# Patient Record
Sex: FEMALE | Race: WHITE | Employment: FULL TIME | URBAN - METROPOLITAN AREA
[De-identification: names, ages, dates, MRNs, and addresses within clinical notes are randomized per-mention and may not be internally consistent; named-entity substitution may affect disease eponyms.]

---

## 2017-02-23 ENCOUNTER — OP HISTORICAL/CONVERTED ENCOUNTER (OUTPATIENT)
Dept: OTHER | Age: 57
End: 2017-02-23

## 2017-08-23 ENCOUNTER — TELEPHONE (OUTPATIENT)
Dept: DERMATOLOGY | Facility: AMBULATORY SURGERY CENTER | Age: 57
End: 2017-08-23

## 2017-08-23 NOTE — TELEPHONE ENCOUNTER
Patient Appointment Date: 08/24/2017      Moriah Alvarado, 60 y.o., female  Is calling for their Mohs Pre-Op Assessment    does not have Hepatitis C or HIV (If YES, set up consult appointment)  does confirm site (if pathology available)  does ID site. Allergies: Allergies not on file      does not have a Pacemaker  does not have a Defibrillator    does not need antibiotics    is not taking NSAIDs    is not taking aspirin    is not taking garlic  is not taking ginkgo  is not taking Ginseng  is not taking fish oils  is not taking vit E    does not take a blood thinner    is not taking Coumadin/Warfarin     Pre operative assessment questions asked to patient. Patient has a general understanding of the procedure, and has been versed that there will be local anesthesia used in the procedure and that She will be ok to drive themselves to and from the appointment.

## 2017-08-24 ENCOUNTER — OFFICE VISIT (OUTPATIENT)
Dept: DERMATOLOGY | Facility: AMBULATORY SURGERY CENTER | Age: 57
End: 2017-08-24

## 2017-08-24 VITALS
OXYGEN SATURATION: 96 % | WEIGHT: 293 LBS | DIASTOLIC BLOOD PRESSURE: 82 MMHG | TEMPERATURE: 98.2 F | BODY MASS INDEX: 47.09 KG/M2 | SYSTOLIC BLOOD PRESSURE: 140 MMHG | HEART RATE: 74 BPM | HEIGHT: 66 IN

## 2017-08-24 DIAGNOSIS — C44.311 BASAL CELL CARCINOMA OF LEFT ALA NASI: Primary | ICD-10-CM

## 2017-08-24 RX ORDER — BUPIVACAINE HYDROCHLORIDE AND EPINEPHRINE 2.5; 5 MG/ML; UG/ML
1 INJECTION, SOLUTION EPIDURAL; INFILTRATION; INTRACAUDAL; PERINEURAL ONCE
Qty: 1 ML | Refills: 0
Start: 2017-08-24 | End: 2017-08-24

## 2017-08-24 RX ORDER — GLIPIZIDE 5 MG/1
TABLET ORAL
Refills: 0 | COMMUNITY
Start: 2017-08-13

## 2017-08-24 RX ORDER — SIMVASTATIN 40 MG/1
TABLET, FILM COATED ORAL
Refills: 11 | COMMUNITY
Start: 2017-08-03 | End: 2022-03-02 | Stop reason: ALTCHOICE

## 2017-08-24 RX ORDER — RANITIDINE 300 MG/1
TABLET ORAL
Refills: 11 | COMMUNITY
Start: 2017-05-27 | End: 2022-03-02 | Stop reason: ALTCHOICE

## 2017-08-24 RX ORDER — LIDOCAINE HYDROCHLORIDE AND EPINEPHRINE 20; 5 MG/ML; UG/ML
1.5 INJECTION, SOLUTION EPIDURAL; INFILTRATION; INTRACAUDAL; PERINEURAL ONCE
Qty: 1.5 ML | Refills: 0 | Status: SHIPPED | OUTPATIENT
Start: 2017-08-24 | End: 2017-08-24

## 2017-08-24 RX ORDER — LOSARTAN POTASSIUM AND HYDROCHLOROTHIAZIDE 25; 100 MG/1; MG/1
TABLET ORAL
Refills: 0 | COMMUNITY
Start: 2017-08-13 | End: 2022-03-02 | Stop reason: ALTCHOICE

## 2017-08-24 RX ORDER — FLUOXETINE HYDROCHLORIDE 40 MG/1
CAPSULE ORAL
Refills: 0 | COMMUNITY
Start: 2017-08-13

## 2017-08-24 NOTE — PROGRESS NOTES
Pre-op: Patient present today for the evaluation of BCC to the Left ala. Procedure explained with full understanding. Vitals:    08/24/17 0913   BP: 140/82   Pulse: 74   Temp: 98.2 °F (36.8 °C)   TempSrc: Oral   SpO2: 96%   Weight: 134.7 kg (297 lb)   Height: 5' 6\" (1.676 m)     preoperatively, will continue to monitor. Post-op: Written and verbal post-op wound care instructions given to patient with full understanding of care. Surgical wound bandaged with Vaseline, Telfa, 2x2 gauze, and coverall tape. All questions and concerns addressed. Vitals stable postoperatively.

## 2017-08-24 NOTE — PATIENT INSTRUCTIONS
WOUND CARE INSTRUCTIONS    1. Keep the dressing clean and dry and do not remove for 48 hours. 2. Then change the dressing once a day as follows:  a. Wash hands before and after each dressing change. b. Remove dressing and wash site gently with mild soap and water, rinse, and pat dry.  c. Apply an ointment (Bacitracin, Polysporin, Neosporin, Petroleum jelly or Aquaphor). d. Apply a non-stick (Telfa) dressing or Band-Aid to cover the wound. Remove pressure bandage Saturday, then wash gently and apply Vaseline and a band-aid to site daily for 1 week. 3. Watch for:  BLEEDING: A small amount of drainage may occur. If bleeding occurs, elevate and rest the surgery site. Apply gauze and steady pressure for 15 minutes. If bleeding continues, call this office. INFECTION: Signs of infection include increased redness, pain, warmth, drainage of pus, and fever. If this occurs, call this office. 4. Special Instructions (follow any that are checked):  · [] You have stitches that need to be removed in 0 days  · [x] Avoid bending at the waist and heavy lifting for two days. · [x] Sleep with your head elevated for the next two nights. · [x] Rest the surgery site and keep it elevated as much as possible for two days. · [x] You may apply an ice-pack for 10-15 minutes every waking hour for the rest of the day. · [] Eat a soft diet and avoid hot food and hot drinks for the rest of the day. · [] Other instructions: Follow up as directed  Take Tylenol for pain as needed. Once the site is healed with no remaining bandages or open areas, protect your surgical site and scar from the sun, as this area will be more sensitive. Use a broad spectrum sunscreen SPF 30 or higher daily, and a chemical free product (one containing zinc oxide or titanium dioxide) is a good choice if the area is sensitive. You may begin to gently massage the surgical site in 2-3 weeks, rubbing in a circular motion along the scar.  This can help reduce swelling and thickness of a scar. A scar cream may be used beginnning 1 month after the surgery. If you have any questions or concerns, please call our office Monday through Friday at [phone].

## 2017-08-24 NOTE — PROGRESS NOTES
This note is written by Alida Mccurdy, as dictated by Derrick Smith. Kriss Coleman MD.    CC: Basal cell carcinoma on the left ala     History of present illness:     Carmen Andrews is a 60 y.o. female referred by Dr. Abrahan Lucas. She has a biopsy-proven nodular basal cell carcinoma on the left ala. This is a recurrent basal cell carcinoma present for years described as a sore lesion that she noticed with prior treatment of cryotherapy. Biopsy confirmed the diagnosis of basal cell carcinoma, and I reviewed the written pathology. She is feeling well and in her usual state of health today. She has no pain, no current illnesses, no other skin concerns. Her allergies, medications, medical, and social history are reviewed by me today. Exam:     She is an awake, alert, and oriented 60 y.o. female who appears well and in no distress. There is no preauricular, submandibular, or cervical lymphadenopathy. I examined her nose. She has an 8 x 7 mm indistinct pink patch on her left ala. She confirms location. Assessment/plan:    1. Basal cell carcinoma, left ala. I discussed the diagnosis of basal cell carcinoma and summarized the pathology report. Mohs surgery is indicated by site, size, poor definition, and recurrence. The procedure was discussed, verbal and written consent were obtained. I performed the procedure. Two stages were required to reach a tumor free plane. The surgical defect was managed with a full thickness skin graft. There were no complications. She will follow up in one week for a graft check and as needed as the site heals. Indications, risks, and options were discussed with Carmen Andrews preoperatively. Risks including, but not limited to: pain, bleeding, infection, tumor recurrence, scarring and damage to motor and/or sensory nerves, were discussed. Carmen Andrews chose Mohs surgery. Carmen Andrews was an acceptable surgery candidate.     Carmen Andrews was placed in the appropriate position on the operating table in the Mohs surgery procedure room. The area was prepped and draped in the standard manner. Gentian violet was used to outline the clinical margins of the tumor. Local anesthesia was then obtained. The grossly visible tumor was then removed, an underlying layer was excised and mapped according to the Mohs technique, and the individual specimens examined microscopically. The process was repeated until microscopic examination of the tissue specimens confirmed a tumor-free plane. Hemostasis was obtained with electrosurgery and pressure. The wound was covered between stages with moist saline gauze. The wound management options of second intent healing, layered closure, local flap, or full thickness skin graft were discussed. Ms. Becky Mario understands the aims, risks, alternatives, and possible complications and elects to proceed with a full thickness skin graft. Ms. Becky Mario was placed in a supine position on the operating table in the Mohs surgery procedure room. A guiding suture utilizing 5-0 polysorb was used on the surgical site to minimize the size of the surgical defect. The area was prepped and draped in the standard manner. A template of donor skin the size of the wound was drawn at the left preauricular. 2% lidocaine with epinephrine 1:100,000 was used to anesthetize the donor area. The graft was removed from the donor area and defatted. Hemostasis was obtained with spot electrocoagulation. The margins of the donor site were undermined and then the site was sutured with 5-0 polysorb sutures to approximate the skin edges. The recipient site for graft was addressed with guiding sutures to reduce the size and width prior placement of the graft for support of the left ala and its rim. The defatted graft was placed on the recipient site and anchored in place with a running 5-0 fast absorbing gut suture around the margin.     A pressure dressing utilizing steristrips, Telfa, gauze, and Coverroll was placed on the donor site. A pressure dressing utilizing Vaseline, Telfa, gauze and Coverroll was placed on the surgical site. Wound care instructions (written and verbal) and a follow up appointment were given to the patient before discharge. Ms. Marylen Mulberry was discharged in good condition. 2. History of non melanoma skin cancer. I discussed the diagnosis and recommend routine examinations with Dr. Marga Devine for surveillance. The documentation recorded by the scribe accurately reflects the service I personally performed and the decisions made by me. [facility]   OFFICE PROCEDURE PROGRESS NOTE     Chart reviewed for the following:     Norberto Young Res, MD, have reviewed the History, Physical and updated the Allergic reactions for [address] performed immediately prior to start of procedure:     Norberto Young Res, MD, have performed the following reviews on Shivam Villegas prior to the start of the procedure:     * Patient was identified by name and date of birth   * Agreement on procedure being performed was verified   * Risks and Benefits explained to the patient   * Procedure site verified and marked as necessary   * Patient was positioned for comfort   * Consent was signed and verified     Time: 8:20 AM   Date of procedure: 8/24/2017  Procedure performed by: Patricia Young Res, MD   Provider assisted by: LPN   Patient assisted by: self   How tolerated by patient: tolerated the procedure well with no complications   Comments: none

## 2017-08-24 NOTE — MR AVS SNAPSHOT
Visit Information Date & Time Provider Department Dept. Phone Encounter #  
 8/24/2017  8:30 AM Lara Ragland MD [facility] [address] [phone] [id number] Upcoming Health Maintenance Date Due Hepatitis C Screening 1960 DTaP/Tdap/Td series (1 - Tdap) 9/1/1979 PAP AKA CERVICAL CYTOLOGY 9/1/1979 BREAST CANCER SCRN MAMMOGRAM 9/1/2008 FOBT Q 1 YEAR AGE 50-75 9/1/2008 INFLUENZA AGE 9 TO ADULT 8/1/2017 Allergies as of 8/24/2017  Review Complete On: 8/24/2017 By: Ami Friend No Known Allergies Current Immunizations  Never Reviewed No immunizations on file. Not reviewed this visit You Were Diagnosed With   
  
 Codes Comments Basal cell carcinoma of left ala nasi    -  Primary ICD-10-CM: W63.785 ICD-9-CM: 173.31 Vitals BP Pulse Temp Height(growth percentile) Weight(growth percentile) SpO2  
 140/82 (BP 1 Location: Left arm, BP Patient Position: Sitting) 74 98.2 °F (36.8 °C) (Oral) 5' 6\" (1.676 m) 297 lb (134.7 kg) 96% BMI OB Status Smoking Status 47.94 kg/m2 Premenopausal Never Smoker Vitals History BMI and BSA Data Body Mass Index Body Surface Area  
 47.94 kg/m 2 2.5 m 2 Preferred Pharmacy Pharmacy Name Phone [id number] [place] [address] [phone] Your Updated Medication List  
  
   
This list is accurate as of: 8/24/17 10:05 AM.  Always use your most recent med list.  
  
  
  
  
 FLUoxetine 40 mg capsule Commonly known as:  PROzac TAKE 1 CAPSULE BY MOUTH ONCE A DAY  
  
 glipiZIDE 5 mg tablet Commonly known as:  GLUCOTROL  
TAKE 1 TABLET ONCE A DAY ORALLY 30  
  
 losartan-hydroCHLOROthiazide 100-25 mg per tablet Commonly known as:  HYZAAR  
TAKE 1 TABLET BEFORE BREAKFAST ONCE A DAY ORALLY 30 DAYS  
  
 raNITIdine 300 mg tablet Commonly known as:  ZANTAC  
1 TABLET AT BEDTIME ONCE A DAY ORALLY 30 DAY(S)  
  
 simvastatin 40 mg tablet Commonly known as:  ZOCOR  
TAKE 0.5 TABLETS IN THE EVENING ONCE A DAY ORALLY 30 DAYS Patient Instructions WOUND CARE INSTRUCTIONS 1. Keep the dressing clean and dry and do not remove for 48 hours. 2. Then change the dressing once a day as follows: 
a. Wash hands before and after each dressing change. b. Remove dressing and wash site gently with mild soap and water, rinse, and pat dry. 
c. Apply an ointment (Bacitracin, Polysporin, Neosporin, Petroleum jelly or Aquaphor). d. Apply a non-stick (Telfa) dressing or Band-Aid to cover the wound. Remove pressure bandage Saturday, then wash gently and apply Vaseline and a band-aid to site daily for 1 week. 3. Watch for: BLEEDING: A small amount of drainage may occur. If bleeding occurs, elevate and rest the surgery site. Apply gauze and steady pressure for 15 minutes. If bleeding continues, call this office. INFECTION: Signs of infection include increased redness, pain, warmth, drainage of pus, and fever. If this occurs, call this office. 4. Special Instructions (follow any that are checked): 
· [] You have stitches that need to be removed in 0 days · [x] Avoid bending at the waist and heavy lifting for two days. · [x] Sleep with your head elevated for the next two nights. · [x] Rest the surgery site and keep it elevated as much as possible for two days. · [x] You may apply an ice-pack for 10-15 minutes every waking hour for the rest of the day. · [] Eat a soft diet and avoid hot food and hot drinks for the rest of the day. · [] Other instructions: Follow up as directed Take Tylenol for pain as needed. Once the site is healed with no remaining bandages or open areas, protect your surgical site and scar from the sun, as this area will be more sensitive.   Use a broad spectrum sunscreen SPF 30 or higher daily, and a chemical free product (one containing zinc oxide or titanium dioxide) is a good choice if the area is sensitive. You may begin to gently massage the surgical site in 2-3 weeks, rubbing in a circular motion along the scar. This can help reduce swelling and thickness of a scar. A scar cream may be used beginnning 1 month after the surgery. If you have any questions or concerns, please call our office Monday through Friday at [phone]. Introducing [facility]! [address] introduces [employer] patient portal. Now you can access parts of your medical record, email your doctor's office, and request medication refills online. 1. In your internet browser, go to [email or web address] 2. Click on the First Time User? Click Here link in the Sign In box. You will see the New Member Sign Up page. 3. Enter your [employer] Access Code exactly as it appears below. You will not need to use this code after youve completed the sign-up process. If you do not sign up before the expiration date, you must request a new code. · [employer] Access Code: [email or web address] Expires: 11/22/2017 10:05 AM 
 
4. Enter the last four digits of your Social Security Number (xxxx) and Date of Birth (mm/dd/yyyy) as indicated and click Submit. You will be taken to the next sign-up page. 5. Create a [employer] ID. This will be your [employer] login ID and cannot be changed, so think of one that is secure and easy to remember. 6. Create a [employer] password. You can change your password at any time. 7. Enter your Password Reset Question and Answer. This can be used at a later time if you forget your password. 8. Enter your e-mail address. You will receive e-mail notification when new information is available in [address] 9. Click Sign Up. You can now view and download portions of your medical record. 10. Click the Download Summary menu link to download a portable copy of your medical information.  
 
If you have questions, please visit the Frequently Asked Questions section of the [employer] website. Remember, [employer] is NOT to be used for urgent needs. For medical emergencies, dial 911. Now available from your iPhone and Android! Please provide this summary of care documentation to your next provider. Your primary care clinician is listed as Karma Mckinney. If you have any questions after today's visit, please call [phone].

## 2017-08-25 ENCOUNTER — TELEPHONE (OUTPATIENT)
Dept: DERMATOLOGY | Facility: AMBULATORY SURGERY CENTER | Age: 57
End: 2017-08-25

## 2017-08-25 NOTE — TELEPHONE ENCOUNTER
Spoke with pt states she is doing good so far from surgery.  Pt states she did have some pain in her teeth last night but that has since gone away

## 2017-08-31 ENCOUNTER — OFFICE VISIT (OUTPATIENT)
Dept: DERMATOLOGY | Facility: AMBULATORY SURGERY CENTER | Age: 57
End: 2017-08-31

## 2017-08-31 VITALS
SYSTOLIC BLOOD PRESSURE: 130 MMHG | OXYGEN SATURATION: 96 % | WEIGHT: 293 LBS | HEART RATE: 59 BPM | HEIGHT: 66 IN | TEMPERATURE: 98.2 F | BODY MASS INDEX: 47.09 KG/M2 | DIASTOLIC BLOOD PRESSURE: 85 MMHG | RESPIRATION RATE: 20 BRPM

## 2017-08-31 DIAGNOSIS — Z85.828 HISTORY OF BASAL CELL CARCINOMA EXCISION: Primary | ICD-10-CM

## 2017-08-31 DIAGNOSIS — Z94.5 HISTORY OF SKIN GRAFT: ICD-10-CM

## 2017-08-31 DIAGNOSIS — Z98.890 HISTORY OF BASAL CELL CARCINOMA EXCISION: Primary | ICD-10-CM

## 2017-08-31 NOTE — PROGRESS NOTES
This note was written by Kendra Goyal, as dictated by Dagoberto Zambrano MD.     Wound check/suture removal:    Chief complaint: wound check. HPI: Tess Osgood presents for wound check following Mohs surgery performed by me on 08/24/17. She had a basal cell carcinoma on the left ala. The surgical site was managed with a guiding suture and full thickness skin graft. The donor site for the full thickeness skin graft was her left preauricular. She reports that the surgical site and donor site have been doing well. Exam: The surgical site was examined. There is not evidence of infection. There is not erythema. There is not edema. There is mild crusting and granulation tissue at the graft site. The donor site was examined. There is not evidence of infection. There is not erythema. There is not edema. A/P:  Wound check. The graft is healing well and was gently debrided. The donor site is healing well. Additional care was reviewed. I recommended the use of Vaseline and a band aid on the surgical site for two weeks. Follow up will be in two weeks. The documentation recorded by the scribe accurately reflects the service I personally performed and the decisions made by me. Isi Jo

## 2017-09-14 ENCOUNTER — OFFICE VISIT (OUTPATIENT)
Dept: DERMATOLOGY | Facility: AMBULATORY SURGERY CENTER | Age: 57
End: 2017-09-14

## 2017-09-14 VITALS
DIASTOLIC BLOOD PRESSURE: 88 MMHG | HEART RATE: 59 BPM | TEMPERATURE: 98.1 F | SYSTOLIC BLOOD PRESSURE: 140 MMHG | OXYGEN SATURATION: 96 %

## 2017-09-14 DIAGNOSIS — Z94.5 HISTORY OF SKIN GRAFT: Primary | ICD-10-CM

## 2017-09-14 NOTE — PROGRESS NOTES
Wound check/suture removal:     Chief complaint: wound check.     HPI: Turner De Los Santos presents for wound check following Mohs surgery performed by me on 08/24/17. She had a basal cell carcinoma on the left ala. The surgical site was managed with a guiding suture and full thickness skin graft. The donor site for the full thickeness skin graft was her left preauricular. She presented on 08/31/17 for a graft check. This check showed that the graft and donor site were healing well. There was mild crusting and granulation tissue at the graft site. The graft site was gently debrided. I recommended the continued use of Vaseline and a band aid. Since her last wound check, she reports mild swelling.      Exam: The surgical site was examined. There is not evidence of infection. There is not erythema. There is not edema. She has a couple remaining polysorb sutures. There is granulation tissue at the graft site. Nasal contour is good.     A/P:  Wound check. The remaining polysorb sutures were removed. The graft is healing well. Additional care was reviewed. I recommended the use of Vaseline and a band aid on the graft site for one week. I advised that she massage the site as well. Follow up will be as needed.     The documentation recorded by the scribe accurately reflects the service I personally performed and the decisions made by me.

## 2018-05-24 ENCOUNTER — OP HISTORICAL/CONVERTED ENCOUNTER (OUTPATIENT)
Dept: OTHER | Age: 58
End: 2018-05-24

## 2019-02-17 ENCOUNTER — IP HISTORICAL/CONVERTED ENCOUNTER (OUTPATIENT)
Dept: OTHER | Age: 59
End: 2019-02-17

## 2019-04-02 ENCOUNTER — IP HISTORICAL/CONVERTED ENCOUNTER (OUTPATIENT)
Dept: OTHER | Age: 59
End: 2019-04-02

## 2019-04-04 ENCOUNTER — ED HISTORICAL/CONVERTED ENCOUNTER (OUTPATIENT)
Dept: OTHER | Age: 59
End: 2019-04-04

## 2020-07-06 ENCOUNTER — ED HISTORICAL/CONVERTED ENCOUNTER (OUTPATIENT)
Dept: OTHER | Age: 60
End: 2020-07-06

## 2022-02-11 ENCOUNTER — APPOINTMENT (OUTPATIENT)
Dept: CT IMAGING | Age: 62
End: 2022-02-11
Attending: NURSE PRACTITIONER
Payer: COMMERCIAL

## 2022-02-11 ENCOUNTER — HOSPITAL ENCOUNTER (EMERGENCY)
Age: 62
Discharge: HOME | End: 2022-02-12
Payer: COMMERCIAL

## 2022-02-11 DIAGNOSIS — R31.21 ASYMPTOMATIC MICROSCOPIC HEMATURIA: Primary | ICD-10-CM

## 2022-02-11 LAB
ALBUMIN SERPL-MCNC: 3.3 G/DL (ref 3.5–5)
ALBUMIN/GLOB SERPL: 0.8 {RATIO} (ref 1.1–2.2)
ALP SERPL-CCNC: 94 U/L (ref 45–117)
ALT SERPL-CCNC: 22 U/L (ref 12–78)
ANION GAP SERPL CALC-SCNC: 5 MMOL/L (ref 5–15)
APPEARANCE UR: ABNORMAL
AST SERPL W P-5'-P-CCNC: 14 U/L (ref 15–37)
BACTERIA URNS QL MICRO: NEGATIVE /HPF
BACTERIA URNS QL MICRO: NEGATIVE /HPF
BASOPHILS # BLD: 0 K/UL (ref 0–0.1)
BASOPHILS NFR BLD: 0 % (ref 0–1)
BILIRUB SERPL-MCNC: 0.4 MG/DL (ref 0.2–1)
BILIRUB UR QL: NEGATIVE
BUN SERPL-MCNC: 19 MG/DL (ref 6–20)
BUN/CREAT SERPL: 18 (ref 12–20)
CA-I BLD-MCNC: 9.2 MG/DL (ref 8.5–10.1)
CHLORIDE SERPL-SCNC: 102 MMOL/L (ref 97–108)
CO2 SERPL-SCNC: 28 MMOL/L (ref 21–32)
COLOR UR: ABNORMAL
CREAT SERPL-MCNC: 1.07 MG/DL (ref 0.55–1.02)
DIFFERENTIAL METHOD BLD: ABNORMAL
EOSINOPHIL # BLD: 0.1 K/UL (ref 0–0.4)
EOSINOPHIL NFR BLD: 1 % (ref 0–7)
ERYTHROCYTE [DISTWIDTH] IN BLOOD BY AUTOMATED COUNT: 13.3 % (ref 11.5–14.5)
GLOBULIN SER CALC-MCNC: 4.2 G/DL (ref 2–4)
GLUCOSE SERPL-MCNC: 251 MG/DL (ref 65–100)
GLUCOSE UR STRIP.AUTO-MCNC: 50 MG/DL
HCT VFR BLD AUTO: 43.1 % (ref 35–47)
HGB BLD-MCNC: 14 G/DL (ref 11.5–16)
HGB UR QL STRIP: ABNORMAL
IMM GRANULOCYTES # BLD AUTO: 0 K/UL (ref 0–0.04)
IMM GRANULOCYTES NFR BLD AUTO: 0 % (ref 0–0.5)
KETONES UR QL STRIP.AUTO: 5 MG/DL
LEUKOCYTE ESTERASE UR QL STRIP.AUTO: NEGATIVE
LYMPHOCYTES # BLD: 3.6 K/UL (ref 0.8–3.5)
LYMPHOCYTES NFR BLD: 31 % (ref 12–49)
MCH RBC QN AUTO: 28.2 PG (ref 26–34)
MCHC RBC AUTO-ENTMCNC: 32.5 G/DL (ref 30–36.5)
MCV RBC AUTO: 86.7 FL (ref 80–99)
MONOCYTES # BLD: 0.8 K/UL (ref 0–1)
MONOCYTES NFR BLD: 7 % (ref 5–13)
MUCOUS THREADS URNS QL MICRO: ABNORMAL /LPF
NEUTS SEG # BLD: 6.9 K/UL (ref 1.8–8)
NEUTS SEG NFR BLD: 61 % (ref 32–75)
NITRITE UR QL STRIP.AUTO: NEGATIVE
NRBC # BLD: 0 K/UL (ref 0–0.01)
NRBC BLD-RTO: 0 PER 100 WBC
PH UR STRIP: 5 [PH] (ref 5–8)
PLATELET # BLD AUTO: 300 K/UL (ref 150–400)
PMV BLD AUTO: 9.1 FL (ref 8.9–12.9)
POTASSIUM SERPL-SCNC: 4.1 MMOL/L (ref 3.5–5.1)
PROT SERPL-MCNC: 7.5 G/DL (ref 6.4–8.2)
PROT UR STRIP-MCNC: 100 MG/DL
RBC # BLD AUTO: 4.97 M/UL (ref 3.8–5.2)
RBC #/AREA URNS HPF: 352 /HPF (ref 0–5)
RBC #/AREA URNS HPF: >100 /HPF (ref 0–5)
SODIUM SERPL-SCNC: 135 MMOL/L (ref 136–145)
SP GR UR REFRACTOMETRY: 1.03 (ref 1–1.03)
UROBILINOGEN UR QL STRIP.AUTO: 0.1 EU/DL (ref 0.1–1)
WBC # BLD AUTO: 11.4 K/UL (ref 3.6–11)
WBC URNS QL MICRO: 24 /HPF (ref 0–4)
WBC URNS QL MICRO: ABNORMAL /HPF (ref 0–4)

## 2022-02-11 PROCEDURE — 74178 CT ABD&PLV WO CNTR FLWD CNTR: CPT

## 2022-02-11 PROCEDURE — 85025 COMPLETE CBC W/AUTO DIFF WBC: CPT

## 2022-02-11 PROCEDURE — 99283 EMERGENCY DEPT VISIT LOW MDM: CPT

## 2022-02-11 PROCEDURE — 74011000636 HC RX REV CODE- 636: Performed by: NURSE PRACTITIONER

## 2022-02-11 PROCEDURE — 80053 COMPREHEN METABOLIC PANEL: CPT

## 2022-02-11 PROCEDURE — 81001 URINALYSIS AUTO W/SCOPE: CPT

## 2022-02-11 RX ADMIN — IOPAMIDOL 100 ML: 755 INJECTION, SOLUTION INTRAVENOUS at 23:28

## 2022-02-12 VITALS
WEIGHT: 293 LBS | OXYGEN SATURATION: 98 % | HEIGHT: 66 IN | HEART RATE: 56 BPM | DIASTOLIC BLOOD PRESSURE: 67 MMHG | BODY MASS INDEX: 47.09 KG/M2 | SYSTOLIC BLOOD PRESSURE: 116 MMHG | TEMPERATURE: 98.1 F | RESPIRATION RATE: 16 BRPM

## 2022-02-12 RX ORDER — CEPHALEXIN 500 MG/1
500 CAPSULE ORAL 4 TIMES DAILY
Qty: 28 CAPSULE | Refills: 0 | Status: SHIPPED | OUTPATIENT
Start: 2022-02-12 | End: 2022-02-12 | Stop reason: SDUPTHER

## 2022-02-12 RX ORDER — CEPHALEXIN 500 MG/1
500 CAPSULE ORAL 4 TIMES DAILY
Qty: 28 CAPSULE | Refills: 0 | Status: SHIPPED | OUTPATIENT
Start: 2022-02-12 | End: 2022-02-19

## 2022-02-12 NOTE — ED PROVIDER NOTES
EMERGENCY DEPARTMENT HISTORY AND PHYSICAL EXAM      Date: 2/11/2022  Patient Name: Riya Gomez    History of Presenting Illness     Chief Complaint   Patient presents with    Blood in Urine       History Provided By: Patient    HPI: Riya Gomez, 59 y.o. female with a past medical history significant diabetes, hypertension, hyperlipidemia and obesity presents to the ED with cc of area. Patient states she noticed she had blood in her urine today. Patient denies any pain. Patient denies any history of bladder cancer. Patient comes in for evaluation of her hematuria. There are no other complaints, changes, or physical findings at this time. PCP: Delores Rdz MD    No current facility-administered medications on file prior to encounter.      Current Outpatient Medications on File Prior to Encounter   Medication Sig Dispense Refill    simvastatin (ZOCOR) 40 mg tablet TAKE 0.5 TABLETS IN THE EVENING ONCE A DAY ORALLY 30 DAYS  11    raNITIdine (ZANTAC) 300 mg tablet 1 TABLET AT BEDTIME ONCE A DAY ORALLY 30 DAY(S)  11    FLUoxetine (PROZAC) 40 mg capsule TAKE 1 CAPSULE BY MOUTH ONCE A DAY  0    glipiZIDE (GLUCOTROL) 5 mg tablet TAKE 1 TABLET ONCE A DAY ORALLY 30  0    losartan-hydroCHLOROthiazide (HYZAAR) 100-25 mg per tablet TAKE 1 TABLET BEFORE BREAKFAST ONCE A DAY ORALLY 30 DAYS  0       Past History     Past Medical History:  Past Medical History:   Diagnosis Date    Atrial fibrillation (HCC)     Diabetes (HCC)     Essential hypertension     Hyperlipidemia     Skin cancer     Sun-damaged skin     Sunburn, blistering        Past Surgical History:  Past Surgical History:   Procedure Laterality Date    HX CHOLECYSTECTOMY      2014    HX MOHS PROCEDURES  08/24/2017    BCC L ala by Dr. Peggy Cowan        Family History:  Family History   Problem Relation Age of Onset    Diabetes Mother     Cancer Mother     Lung Disease Mother     Heart Disease Father     Diabetes Father     Cancer Paternal Aunt        Social History:  Social History     Tobacco Use    Smoking status: Never Smoker    Smokeless tobacco: Never Used   Substance Use Topics    Alcohol use: No    Drug use: No       Allergies:  No Known Allergies      Review of Systems   Review of Systems   Constitutional: Negative for chills, fatigue and fever. HENT: Negative for congestion, sinus pressure and trouble swallowing. Eyes: Negative for photophobia and pain. Respiratory: Negative for cough and shortness of breath. Cardiovascular: Negative for chest pain and leg swelling. Gastrointestinal: Negative for abdominal pain, diarrhea, nausea and vomiting. Endocrine: Negative for polydipsia, polyphagia and polyuria. Genitourinary: Positive for hematuria. Negative for decreased urine volume, difficulty urinating, dysuria and urgency. Musculoskeletal: Negative for back pain, gait problem, myalgias and neck pain. Skin: Negative for pallor and rash. Allergic/Immunologic: Negative for environmental allergies and food allergies. Neurological: Negative for dizziness, facial asymmetry, speech difficulty, numbness and headaches. Hematological: Negative for adenopathy. Does not bruise/bleed easily. Psychiatric/Behavioral: Negative for agitation, self-injury and suicidal ideas. The patient is not nervous/anxious. Physical Exam     Physical Exam  Vitals and nursing note reviewed. Constitutional:       Appearance: Normal appearance. HENT:      Head: Atraumatic. Right Ear: Tympanic membrane and external ear normal.      Left Ear: Tympanic membrane and external ear normal.      Nose: Nose normal.      Mouth/Throat:      Mouth: Mucous membranes are dry. Eyes:      Extraocular Movements: Extraocular movements intact. Pupils: Pupils are equal, round, and reactive to light. Cardiovascular:      Rate and Rhythm: Normal rate and regular rhythm. Pulses: Normal pulses. Heart sounds: Normal heart sounds. Pulmonary:      Breath sounds: Normal breath sounds. Abdominal:      General: Abdomen is flat. Palpations: Abdomen is soft. Musculoskeletal:         General: Normal range of motion. Cervical back: Normal range of motion and neck supple. Skin:     General: Skin is warm and dry. Capillary Refill: Capillary refill takes less than 2 seconds. Neurological:      General: No focal deficit present. Mental Status: She is alert and oriented to person, place, and time. Mental status is at baseline.    Psychiatric:         Mood and Affect: Mood normal.         Behavior: Behavior normal.         Lab and Diagnostic Study Results     Labs -     Recent Results (from the past 12 hour(s))   URINALYSIS W/ RFLX MICROSCOPIC    Collection Time: 02/11/22  7:52 PM   Result Value Ref Range    Color Martine      Appearance Turbid (A) Clear      Specific gravity 1.029 1.003 - 1.030      pH (UA) 5.0 5.0 - 8.0      Protein 100 (A) Negative mg/dL    Glucose 50 (A) Negative mg/dL    Ketone 5 (A) Negative mg/dL    Bilirubin Negative Negative      Blood Large (A) Negative      Urobilinogen 0.1 0.1 - 1.0 EU/dL    Nitrites Negative Negative      Leukocyte Esterase Negative Negative      WBC 20-50 0 - 4 /hpf    RBC >100 (H) 0 - 5 /hpf    Bacteria Negative Negative /hpf    Mucus 4+ /lpf   URINE MICROSCOPIC    Collection Time: 02/11/22  7:52 PM   Result Value Ref Range    WBC 24 (H) 0 - 4 /hpf     (H) 0 - 5 /hpf    Bacteria Negative Negative /hpf   CBC WITH AUTOMATED DIFF    Collection Time: 02/11/22 10:00 PM   Result Value Ref Range    WBC 11.4 (H) 3.6 - 11.0 K/uL    RBC 4.97 3.80 - 5.20 M/uL    HGB 14.0 11.5 - 16.0 g/dL    HCT 43.1 35.0 - 47.0 %    MCV 86.7 80.0 - 99.0 FL    MCH 28.2 26.0 - 34.0 PG    MCHC 32.5 30.0 - 36.5 g/dL    RDW 13.3 11.5 - 14.5 %    PLATELET 249 038 - 327 K/uL    MPV 9.1 8.9 - 12.9 FL    NRBC 0.0 0.0  WBC    ABSOLUTE NRBC 0.00 0.00 - 0.01 K/uL    NEUTROPHILS 61 32 - 75 %    LYMPHOCYTES 31 12 - 49 %    MONOCYTES 7 5 - 13 %    EOSINOPHILS 1 0 - 7 %    BASOPHILS 0 0 - 1 %    IMMATURE GRANULOCYTES 0 0 - 0.5 %    ABS. NEUTROPHILS 6.9 1.8 - 8.0 K/UL    ABS. LYMPHOCYTES 3.6 (H) 0.8 - 3.5 K/UL    ABS. MONOCYTES 0.8 0.0 - 1.0 K/UL    ABS. EOSINOPHILS 0.1 0.0 - 0.4 K/UL    ABS. BASOPHILS 0.0 0.0 - 0.1 K/UL    ABS. IMM. GRANS. 0.0 0.00 - 0.04 K/UL    DF AUTOMATED     METABOLIC PANEL, COMPREHENSIVE    Collection Time: 02/11/22 10:00 PM   Result Value Ref Range    Sodium 135 (L) 136 - 145 mmol/L    Potassium 4.1 3.5 - 5.1 mmol/L    Chloride 102 97 - 108 mmol/L    CO2 28 21 - 32 mmol/L    Anion gap 5 5 - 15 mmol/L    Glucose 251 (H) 65 - 100 mg/dL    BUN 19 6 - 20 mg/dL    Creatinine 1.07 (H) 0.55 - 1.02 mg/dL    BUN/Creatinine ratio 18 12 - 20      GFR est AA >60 >60 ml/min/1.73m2    GFR est non-AA 52 (L) >60 ml/min/1.73m2    Calcium 9.2 8.5 - 10.1 mg/dL    Bilirubin, total 0.4 0.2 - 1.0 mg/dL    AST (SGOT) 14 (L) 15 - 37 U/L    ALT (SGPT) 22 12 - 78 U/L    Alk. phosphatase 94 45 - 117 U/L    Protein, total 7.5 6.4 - 8.2 g/dL    Albumin 3.3 (L) 3.5 - 5.0 g/dL    Globulin 4.2 (H) 2.0 - 4.0 g/dL    A-G Ratio 0.8 (L) 1.1 - 2.2         Radiologic Studies -   @lastxrresult@  CT Results  (Last 48 hours)               02/11/22 2328  CT ABD PELV W WO CONT Final result    Impression:      No urinary stone or discrete mass. Renal lesions are too small to characterize,   may represent cysts or other. Urology consult and follow-up as clinically   indicated. Few colonic diverticula without acute diverticulitis. Please see full report.        Narrative:  CT abdomen and pelvis without and with intravenous contrast, 100 cc [id number]       Dose reduction: All CT scans at this facility are performed using dose reduction   optimization techniques as appropriate to a performed exam including the   following: Automated exposure control, adjustments of the mA and/or kV according   to patient size, or use of iterative reconstruction technique. INDICATION: Painless hematuria       FINDINGS:       No airspace disease in the lung bases. Liver and spleen are unremarkable. Pancreas is somewhat atrophic. No aneurysm of abdominal aorta. Mild   atherosclerosis. Cholecystectomy. No urinary stone or hydronephrosis or discrete renal mass on either side. Subcentimeter lesion in the lower pole of left kidney and probable tiny   additional bilateral renal lesions are too small to characterize, may represent   cysts or other. Excretion of contrast is seen from the kidneys on the delayed   images down to bladder. No obvious mass in the urinary bladder, although it is   only partially opacified. No bowel obstruction. Few colonic diverticula without acute diverticulitis. No   inflammatory changes to suggest appendicitis. Uterus and ovaries appear grossly   unremarkable based on CT criteria. Small periumbilical defect containing fat. No   free fluid in the pelvis. No acute fracture in the visualized bony structures. CXR Results  (Last 48 hours)    None            Medical Decision Making   - I am the first provider for this patient. - I reviewed the vital signs, available nursing notes, past medical history, past surgical history, family history and social history. - Initial assessment performed. The patients presenting problems have been discussed, and they are in agreement with the care plan formulated and outlined with them. I have encouraged them to ask questions as they arise throughout their visit. Vital Signs-Reviewed the patient's vital signs. Patient Vitals for the past 12 hrs:   Temp Pulse Resp BP SpO2   02/12/22 0032  (!) 56 16 116/67 98 %   02/11/22 1917 98.1 °F (36.7 °C) 66 16 (!) 155/80 98 %       Records Reviewed: Nursing Notes and Old Medical Records          ED Course:          Provider Notes (Medical Decision Making):   Patient presents with hematuria.   Differential diagnosis include kidney stone, UTI, STD, bladder cancer. Patient denies any pain. Patient will be discharged home with follow-up urology. MDM       Procedures   Medical Decision Makingedical Decision Making  Performed by: Joyce Winslow NP  PROCEDURES:  Procedures       Disposition   Disposition: DC- Adult Discharges: All of the diagnostic tests were reviewed and questions answered. Diagnosis, care plan and treatment options were discussed. The patient understands the instructions and will follow up as directed. The patients results have been reviewed with them. They have been counseled regarding their diagnosis. The patient verbally convey understanding and agreement of the signs, symptoms, diagnosis, treatment and prognosis and additionally agrees to follow up as recommended with their PCP in 24 - 48 hours. They also agree with the care-plan and convey that all of their questions have been answered. I have also put together some discharge instructions for them that include: 1) educational information regarding their diagnosis, 2) how to care for their diagnosis at home, as well a 3) list of reasons why they would want to return to the ED prior to their follow-up appointment, should their condition change. Discharged    DISCHARGE PLAN:  1.    Current Discharge Medication List      CONTINUE these medications which have NOT CHANGED    Details   simvastatin (ZOCOR) 40 mg tablet TAKE 0.5 TABLETS IN THE EVENING ONCE A DAY ORALLY 30 DAYS  Refills: 11    Associated Diagnoses: Basal cell carcinoma of left ala nasi      raNITIdine (ZANTAC) 300 mg tablet 1 TABLET AT BEDTIME ONCE A DAY ORALLY 30 DAY(S)  Refills: 11    Associated Diagnoses: Basal cell carcinoma of left ala nasi      FLUoxetine (PROZAC) 40 mg capsule TAKE 1 CAPSULE BY MOUTH ONCE A DAY  Refills: 0    Associated Diagnoses: Basal cell carcinoma of left ala nasi      glipiZIDE (GLUCOTROL) 5 mg tablet TAKE 1 TABLET ONCE A DAY ORALLY 30  Refills: 0    Associated Diagnoses: Basal cell carcinoma of left ala nasi      losartan-hydroCHLOROthiazide (HYZAAR) 100-25 mg per tablet TAKE 1 TABLET BEFORE BREAKFAST ONCE A DAY ORALLY 30 DAYS  Refills: 0    Associated Diagnoses: Basal cell carcinoma of left ala nasi           2. Follow-up Information     Follow up With Specialties Details Why Contact Info    Hair Bermudez MD Urology Schedule an appointment as soon as possible for a visit  As needed [address] [place] [id number] [facility]    [address] [place] [address]        3. Return to ED if worse   4. Discharge Medication List as of 2/12/2022 12:13 AM      START taking these medications    Details   cephALEXin (Keflex) 500 mg capsule Take 1 Capsule by mouth four (4) times daily for 7 days. , Normal, Disp-28 Capsule, R-0         CONTINUE these medications which have NOT CHANGED    Details   simvastatin (ZOCOR) 40 mg tablet TAKE 0.5 TABLETS IN THE EVENING ONCE A DAY ORALLY 30 DAYS, Historical Med, R-11      raNITIdine (ZANTAC) 300 mg tablet 1 TABLET AT BEDTIME ONCE A DAY ORALLY 30 DAY(S), Historical Med, R-11      FLUoxetine (PROZAC) 40 mg capsule TAKE 1 CAPSULE BY MOUTH ONCE A DAY, Historical Med, R-0      glipiZIDE (GLUCOTROL) 5 mg tablet TAKE 1 TABLET ONCE A DAY ORALLY 30, Historical Med, R-0      losartan-hydroCHLOROthiazide (HYZAAR) 100-25 mg per tablet TAKE 1 TABLET BEFORE BREAKFAST ONCE A DAY ORALLY 30 DAYS, Historical Med, R-0               Diagnosis     Clinical Impression:   1. Asymptomatic microscopic hematuria        Attestations:    Cristina Marquez NP    Please note that this dictation was completed with [email or web address], the [employer] voice recognition software. Quite often unanticipated grammatical, syntax, homophones, and other interpretive errors are inadvertently transcribed by the computer software. Please disregard these errors. Please excuse any errors that have escaped final proofreading.   Thank you.

## 2022-02-12 NOTE — ED TRIAGE NOTES
Pt states that she noted some blood in her urine today. States recently started on lasix. Denies any pain/burning with urination.

## 2022-02-15 ENCOUNTER — TELEPHONE (OUTPATIENT)
Dept: UROLOGY | Age: 62
End: 2022-02-15

## 2022-02-15 NOTE — TELEPHONE ENCOUNTER
Patient was seen at [facility] on 2/11 for blood in urine and suspected bladder cancer after test was done.  Told to follow up as soon as possible with dr. Tomasa Watkins

## 2022-02-17 NOTE — TELEPHONE ENCOUNTER
Lvm for  patient providing her with date and time if that date worked for her asked her to call the office to let us know

## 2022-02-23 PROBLEM — R31.0 GROSS HEMATURIA: Status: ACTIVE | Noted: 2022-02-23

## 2022-03-02 ENCOUNTER — OFFICE VISIT (OUTPATIENT)
Dept: UROLOGY | Age: 62
End: 2022-03-02
Payer: COMMERCIAL

## 2022-03-02 VITALS — WEIGHT: 293 LBS | BODY MASS INDEX: 47.09 KG/M2 | HEIGHT: 66 IN

## 2022-03-02 DIAGNOSIS — R31.0 GROSS HEMATURIA: ICD-10-CM

## 2022-03-02 PROCEDURE — 99204 OFFICE O/P NEW MOD 45 MIN: CPT | Performed by: UROLOGY

## 2022-03-02 RX ORDER — DULAGLUTIDE 3 MG/.5ML
INJECTION, SOLUTION SUBCUTANEOUS
COMMUNITY

## 2022-03-02 RX ORDER — INSULIN GLARGINE 300 U/ML
INJECTION, SOLUTION SUBCUTANEOUS
COMMUNITY

## 2022-03-02 RX ORDER — DILTIAZEM HYDROCHLORIDE 120 MG/1
CAPSULE, EXTENDED RELEASE ORAL DAILY
COMMUNITY

## 2022-03-02 RX ORDER — POTASSIUM CHLORIDE 750 MG/1
10 CAPSULE, EXTENDED RELEASE ORAL 2 TIMES DAILY
COMMUNITY

## 2022-03-02 RX ORDER — VALSARTAN 160 MG/1
TABLET ORAL DAILY
COMMUNITY

## 2022-03-02 RX ORDER — ATORVASTATIN CALCIUM 20 MG/1
TABLET, FILM COATED ORAL DAILY
COMMUNITY

## 2022-03-02 RX ORDER — FUROSEMIDE 40 MG/1
TABLET ORAL DAILY
COMMUNITY

## 2022-03-02 RX ORDER — PHENOL/SODIUM PHENOLATE
20 AEROSOL, SPRAY (ML) MUCOUS MEMBRANE DAILY
COMMUNITY

## 2022-03-02 NOTE — PROGRESS NOTES
HISTORY OF PRESENT ILLNESS  Noelle Thakkar is a 59 y.o. female. has a past medical history of Atrial fibrillation ([address] Diabetes ([address] Essential hypertension, Hyperlipidemia, Skin cancer, Sun-damaged skin, and Sunburn, blistering. She has no past medical history of Arsenic suspected exposure, Family history of skin cancer, Human immunodeficiency virus (HIV) disease ([address] Pacemaker, Radiation exposure, or Tanning bed exposure. has a past surgical history that includes hx mohs procedure (08/24/2017) and hx cholecystectomy. Chief Complaint   Patient presents with    New Patient    Gross Hematuria     HPI   She had gross painless hematuria presented to the emergency room on 2/11/2022. CT did not reveal stones, hydronephrosis or renal or ureteral abnormalities of concern. 3 months ago she had blood in her underwear for 1 day. She denies dysuria. She can have urinary frequency when she takes Lasix. It can be hard to hold the urine and she can leak down her leg. She does not normally wear a pad. Not a smoker.  smoked outside. Chronic Conditions Addressed Today     1. Gross hematuria     Overview      2/11/22: presented to ER with cc of gross hematuria. CT impression: no urinary stone or discrete mass. Renal lesions are too small to characterize, may represent cysts or other. UA micro with 24 WBC, 352 RBC and no bacteria. Current Assessment & Plan       Gross painless hematuria. On a blood thinner. She does have a history of atrial fibrillation. CT without obvious concerning upper tract findings. She should have cystoscopic evaluation. Plan on office cystoscopy               Past Medical History:    PMHx (including negatives):  has a past medical history of Atrial fibrillation ([address] Diabetes ([address] Essential hypertension, Hyperlipidemia, Skin cancer, Sun-damaged skin, and Sunburn, blistering.     She has no past medical history of Arsenic suspected exposure, Family history of skin cancer, Human immunodeficiency virus (HIV) disease ([facility] [address] Pacemaker, Radiation exposure, or Tanning bed exposure. PSurgHx:  has a past surgical history that includes hx mohs procedure (08/24/2017) and hx cholecystectomy. PSocHx:  reports that she has never smoked. She has never used smokeless tobacco. She reports previous alcohol use. She reports that she does not use drugs. Review of Systems   Genitourinary: Positive for hematuria. Negative for dysuria. All other systems reviewed and are negative. Physical Exam  No Known Allergies   Prior to Admission medications    Medication Sig Start Date End Date Taking? Authorizing Provider   valsartan (DIOVAN) 160 mg tablet Take  by mouth daily. Yes Provider, Historical   dulaglutide (Trulicity) 3 RJ/2.0 mL pnij by SubCUTAneous route. Yes Provider, Historical   insulin glargine U-300 conc (Toujeo Max U-300 SoloStar) 300 unit/mL (3 mL) inpn by SubCUTAneous route. Yes Provider, Historical   dronedarone (Multaq) tab tablet Take 400 mg by mouth two (2) times daily (with meals). Yes Provider, Historical   dilTIAZem ER (DILACOR XR) 120 mg capsule Take  by mouth daily. Yes Provider, Historical   potassium chloride SA (MICRO-K) 10 mEq capsule Take 10 mEq by mouth two (2) times a day. Yes Provider, Historical   furosemide (Lasix) 40 mg tablet Take  by mouth daily. Yes Provider, Historical   Omeprazole delayed release (PRILOSEC D/R) 20 mg tablet Take 20 mg by mouth daily. Yes Provider, Historical   atorvastatin (LIPITOR) 20 mg tablet Take  by mouth daily. Yes Provider, Historical   rivaroxaban (XARELTO) 10 mg tablet Take 10 mg by mouth daily.    Yes Provider, Historical   FLUoxetine (PROZAC) 40 mg capsule TAKE 1 CAPSULE BY MOUTH ONCE A DAY 8/13/17  Yes Provider, Historical   glipiZIDE (GLUCOTROL) 5 mg tablet TAKE 1 TABLET ONCE A DAY ORALLY 30 8/13/17  Yes Provider, Historical        ASSESSMENT and PLAN  Diagnoses and all orders for this visit: 1. Gross hematuria  Assessment & Plan:   Gross painless hematuria. On a blood thinner. She does have a history of atrial fibrillation. CT without obvious concerning upper tract findings. She should have cystoscopic evaluation.   Plan on office cystoscopy           Sudeep Remy MD

## 2022-03-02 NOTE — PROGRESS NOTES
Chief Complaint   Patient presents with    New Patient    Gross Hematuria       PHQ-9 score is   0 Negative    Vitals:    03/02/22 1409   Weight: 300 lb (136.1 kg)   Height: 5' 6\" (1.676 m)   PainSc:   0 - No pain      1. \"Have you been to the ER, urgent care clinic since your last visit? Hospitalized since your last visit? \" Yes Where: [facility]    2. \"Have you seen or consulted any other health care providers outside of the [facility] since your last visit? \" No     3. For patients aged 39-70: Has the patient had a colonoscopy / FIT/ Cologuard? No      If the patient is female:    4. For patients aged 41-77: Has the patient had a mammogram within the past 2 years? No      5. For patients aged 21-65: Has the patient had a pap smear?  No

## 2022-03-02 NOTE — LETTER
3/2/2022    Patient: Tabatha Barajas   YOB: 1960   Date of Visit: 3/2/2022     Diomedes Baldwin MD  [address] [address]  Via Fax: [phone]    Dear Diomedes Baldwin MD,      Thank you for referring Ms. Tabatha Barajas to  [facility] for evaluation. My notes for this consultation are attached. If you have questions, please do not hesitate to call me. I look forward to following your patient along with you.       Sincerely,    Ketty Delong MD

## 2022-03-02 NOTE — ASSESSMENT & PLAN NOTE
Gross painless hematuria. On a blood thinner. She does have a history of atrial fibrillation. CT without obvious concerning upper tract findings. She should have cystoscopic evaluation.   Plan on office cystoscopy

## 2022-03-20 PROBLEM — R31.0 GROSS HEMATURIA: Status: ACTIVE | Noted: 2022-02-23

## 2022-03-30 NOTE — PROGRESS NOTES
HISTORY OF PRESENT ILLNESS  David Babcock is a 59 y.o. female. Chief Complaint   Patient presents with    Cystoscopy    Gross Hematuria     Past Medical History:  PMHx (including negatives):  has a past medical history of Atrial fibrillation ([address] Diabetes ([address] Essential hypertension, Hyperlipidemia, Skin cancer, Sun-damaged skin, and Sunburn, blistering. She has no past medical history of Arsenic suspected exposure, Family history of skin cancer, Human immunodeficiency virus (HIV) disease ([facility] [address] Pacemaker, Radiation exposure, or Tanning bed exposure. PSurgHx:  has a past surgical history that includes hx mohs procedure (08/24/2017); hx cholecystectomy; and hx urological (04/01/2022). PSocHx:  reports that she has never smoked. She has never used smokeless tobacco. She reports previous alcohol use. She reports that she does not use drugs. She had gross, hematuria in February. CT scan did not show any obvious upper tract source. She is here today for further evaluation via cystoscopy. She is on anticoagulation. She can have urinary frequency when she takes Lasix. It can be hard to hold the urine and she can leak down her leg. She does not normally wear a pad. Not a smoker.  smoked outside. Chronic Conditions Addressed Today     1. Gross hematuria     Overview      2/11/22: presented to ER with cc of gross hematuria. CT impression: no urinary stone or discrete mass. Renal lesions are too small to characterize, may represent cysts or other. UA micro with 24 WBC, 352 RBC and no bacteria. On anticoagulation. Current Assessment & Plan      CT on 2/11/2022 without upper tract concerns. There may be small cysts. On Xarelto. Cystoscopy today without concerning findings. 2. Stress incontinence     Current Assessment & Plan       She has chronic mild stress incontinence not requiring a pad. She is encouraged to do Kegel exercises.          3. Urgency incontinence Current Assessment & Plan      She has leakage if she takes her diuretic. She does not take it every day due to the frequency and urgency. She can manage when she is not taking a diuretic                    ROS  Patient denies the symptoms of COVID-19 per routine screening guidelines. Physical Exam  Vitals reviewed. Constitutional:       General: She is not in acute distress. Appearance: Normal appearance. She is obese. She is not ill-appearing, toxic-appearing or diaphoretic. HENT:      Head: Normocephalic and atraumatic. Nose: Nose normal.   Pulmonary:      Effort: Pulmonary effort is normal. No respiratory distress. Breath sounds: Normal breath sounds. Abdominal:      General: Abdomen is flat. Bowel sounds are normal.      Palpations: Abdomen is soft. Genitourinary:     General: Normal vulva. Exam position: Lithotomy position. Pubic Area: No rash. Labia:         Right: No rash, tenderness, lesion or injury. Left: No rash, tenderness or lesion. Urethra: No prolapse, urethral pain, urethral swelling or urethral lesion. Comments: Large labial to urethral distance. No obvious hypermobility but exam was limited  Musculoskeletal:      Cervical back: Normal range of motion. Skin:     General: Skin is warm and dry. Neurological:      General: No focal deficit present. Mental Status: She is alert and oriented to person, place, and time. Psychiatric:         Mood and Affect: Mood normal.         ASSESSMENT and PLAN  Diagnoses and all orders for this visit:    1. Gross hematuria  Assessment & Plan:  CT on 2/11/2022 without upper tract concerns. There may be small cysts. On Xarelto. Cystoscopy today without concerning findings. 2. Stress incontinence  Assessment & Plan:   She has chronic mild stress incontinence not requiring a pad. She is encouraged to do Kegel exercises.       3. Urgency incontinence  Assessment & Plan:  She has leakage if she takes her diuretic. She does not take it every day due to the frequency and urgency. She can manage when she is not taking a diuretic           Follow-up and Dispositions    · Return if symptoms worsen or fail to improve. Aba Lin may have a reminder for a \"due or due soon\" health maintenance. The patient has been encouraged to contact their primary care provider for follow-up on this health maintenance or other necessary and/or routine health screening.      Bakari Velarde MD

## 2022-04-01 ENCOUNTER — OFFICE VISIT (OUTPATIENT)
Dept: UROLOGY | Age: 62
End: 2022-04-01
Payer: COMMERCIAL

## 2022-04-01 VITALS
DIASTOLIC BLOOD PRESSURE: 71 MMHG | BODY MASS INDEX: 47.09 KG/M2 | OXYGEN SATURATION: 98 % | SYSTOLIC BLOOD PRESSURE: 149 MMHG | TEMPERATURE: 97.1 F | HEIGHT: 66 IN | HEART RATE: 61 BPM | WEIGHT: 293 LBS

## 2022-04-01 DIAGNOSIS — R31.0 GROSS HEMATURIA: ICD-10-CM

## 2022-04-01 DIAGNOSIS — N39.41 URGENCY INCONTINENCE: ICD-10-CM

## 2022-04-01 DIAGNOSIS — N39.3 STRESS INCONTINENCE: ICD-10-CM

## 2022-04-01 LAB
BILIRUB UR QL: NEGATIVE
GLUCOSE UR-MCNC: NEGATIVE MG/DL
KETONES P FAST UR STRIP-MCNC: NEGATIVE MG/DL
PH UR STRIP: 5.5 [PH] (ref 4.6–8)
PROT UR QL STRIP: NEGATIVE
PVR POC: 0 CC
SP GR UR STRIP: 1.03 (ref 1–1.03)
UA UROBILINOGEN AMB POC: NORMAL (ref 0.2–1)
URINALYSIS CLARITY POC: CLEAR
URINALYSIS COLOR POC: YELLOW
URINE BLOOD POC: NORMAL
URINE LEUKOCYTES POC: NEGATIVE
URINE NITRITES POC: NEGATIVE

## 2022-04-01 PROCEDURE — 51725 SIMPLE CYSTOMETROGRAM: CPT | Performed by: UROLOGY

## 2022-04-01 PROCEDURE — 52000 CYSTOURETHROSCOPY: CPT | Performed by: UROLOGY

## 2022-04-01 PROCEDURE — 81003 URINALYSIS AUTO W/O SCOPE: CPT | Performed by: UROLOGY

## 2022-04-01 PROCEDURE — 99214 OFFICE O/P EST MOD 30 MIN: CPT | Performed by: UROLOGY

## 2022-04-01 PROCEDURE — 51798 US URINE CAPACITY MEASURE: CPT | Performed by: UROLOGY

## 2022-04-01 NOTE — ASSESSMENT & PLAN NOTE
She has chronic mild stress incontinence not requiring a pad. She is encouraged to do Kegel exercises.

## 2022-04-01 NOTE — PROGRESS NOTES
Cystoscopy - Female    Findings:  Initial residual: minimal  Anterior urethra: normal mucosa  Bladder neck: Normal appearing  Bladder mucosa: Intact, no bas fond deformity, did not descend with strain  Trabeculation: none  Diverticula: none  Ureteral orifices: normal, efflux clear urine    CMG    Initial urge at (cc): 150  Strong urge at (cc): 200  Findings: No uninhibited contractions noted. No urge related incontinence noted    Uroflow did not register  Residual Volume: 0ml    Impression: Normal appearing bladder. Normal emptying.

## 2022-04-01 NOTE — ASSESSMENT & PLAN NOTE
She has leakage if she takes her diuretic. She does not take it every day due to the frequency and urgency.   She can manage when she is not taking a diuretic

## 2022-04-01 NOTE — PROGRESS NOTES
Chief Complaint   Patient presents with    Cystoscopy    Gross Hematuria       PHQ-9 score is    Negative    Vitals:    04/01/22 1112   BP: (!) 149/71   Pulse: 61   Temp: 97.1 °F (36.2 °C)   TempSrc: Temporal   SpO2: 98%   Weight: 300 lb (136.1 kg)   Height: 5' 6\" (1.676 m)   PainSc:   0 - No pain      1. \"Have you been to the ER, urgent care clinic since your last visit? Hospitalized since your last visit? \" No    2. \"Have you seen or consulted any other health care providers outside of the [address] [place] [address] since your last visit? \" No     3. For patients aged 39-70: Has the patient had a colonoscopy / FIT/ Cologuard? No      If the patient is female:    4. For patients aged 41-77: Has the patient had a mammogram within the past 2 years? No      5. For patients aged 21-65: Has the patient had a pap smear?  No

## 2022-04-01 NOTE — LETTER
4/1/2022    Patient: Cat Zaldivar   YOB: 1960   Date of Visit: 4/1/2022     Samy Canales MD  [facility]  [address] [address]  Via Fax: [phone]    Dear Samy Canales MD,      Thank you for referring Ms. Cat Zaldivar to [place] [address] for evaluation. My notes for this consultation are attached. If you have questions, please do not hesitate to call me. I look forward to following your patient along with you.       Sincerely,    Ashly Pond MD

## 2022-04-01 NOTE — ASSESSMENT & PLAN NOTE
CT on 2/11/2022 without upper tract concerns. There may be small cysts. On Xarelto. Cystoscopy today without concerning findings.

## 2022-04-01 NOTE — PATIENT INSTRUCTIONS
Kegel Exercise For Women   What are kegel exercises? Kegel exercises help to stregthen the pelvic muscles. Kegel exercise may help to bring back or improve bladder control in people with urinary incontence. These exercises are done by tadeo (tightening) and relaxing the pelvic muscels. Kegel exercises are also called pelvic floor muscle training or pelvic floor exercise. They must be done correctly and regularly in order to help streghten the pelvic muscles. What are pelvic muscles? Pelvic muscles are attatched to the area between your pelvic (hip) bones. These muscles act like a strong floor that helps to hold your pelvic organs in place. Examples of pelvic organs are the bladder (holds urine) vagina, uterus (womb), rectum (holds bowel movements). Certain conditions may cause the pelvic muscles to weaken. Some of these conditions include being overweight, aging or pregnancy and childbirth. When your pelvic muscles become weak, you may have urinary incontinence or other problems. Which are the correct muscles to use for kegel exercises? Some people use the wrong muscles when doing kegel exercises. Instead of using the pelvic muscles, they use their back, abdominal or upper leg muscles. If you use the wrong muscles, the kegel exercises will not help you. To make sure you are using the right muscles, try the following:    Sit on the toilet. When passing urine, tighten your muscles to stop the flow of urine. Do this serveral times until you know what it feels like to tighten the correct muscles. Once you have found the right muscles to use, only do kegel exercises when you are not urinating.  Lie down and put one finger in your vagina. Tighten your vaginal muscles as if trying to stop urine and a bowel movement from coming out. You should feel the correct muscle tightening around your finger. How are kegel exercises done? Kegel exercises can be done anytime and anywhere.  You can do them in the morning, noon or night. The exercisises can be done when sitting, standing, lying on your back or taking a bath. Always urinate (empty the bladder) before starting. Do these exercises each day or as directed by your provider.  Slow Contractions:  Contract the muscles around your vagina (birth canal) and anus (rear end). This should feel like you are trying to hold back urine or gas    Hold these muscles for a count of 10    Slowly release these muscles and relax for a count of 10. Repeat the cycle again.  Do a set of 10 contractions at least three times everyday, or as often as your provider suggests    Quick Contractions: Do 5 to 10 quick, strong contractions after you are finished doing slow contractions. These exercisises may help you prevent accidents by quickly stopping urine leaks. Remember: Keep your abdominal (stomach), back and leg muscles relaxed during kegel excersises. You should feel only the muscles between your legs (pelvic muscles) tadeo. Try not to hold your breath while doing these exercisises. What can I do if my muscles are to weak to hold contractions? At the beginning, many people cannot contract their muscles for a count of 10. Start kegel exercisises by squeezing and relaxing pelvic muscles for 4 to 5 seconds each. You can increase your count as your muscle tone improves. How can I remember to do my kegel exercisises regularly? Do your exercisises at the same time evryday. For example, do kegel exercisises when you wakeup in the morning, right before lunch and bedtime. Keep a kegel exercise chart or diary. Write down or check off how many times each day you do kegel exercisises. Write down how many exercisises you do each time. What else should I know about kegel exercisises?  It may take 3 to 6 months after starting kegel exercisises to see a difference in bladder control. You may begin to notice improved bladder control after 6 to 8 weeks.    Do not stop doing kegel exercisises until you have talked to your provider. Kegel exercise may be useful for the rest of your life.     Tighten your pelvic muscles before sneezing, coughing or lifting to prevent urine leakage   

## 2022-06-18 ENCOUNTER — APPOINTMENT (OUTPATIENT)
Dept: GENERAL RADIOLOGY | Age: 62
End: 2022-06-18
Attending: NURSE PRACTITIONER
Payer: COMMERCIAL

## 2022-06-18 ENCOUNTER — HOSPITAL ENCOUNTER (OUTPATIENT)
Age: 62
Setting detail: OBSERVATION
Discharge: HOME | End: 2022-06-19
Attending: INTERNAL MEDICINE | Admitting: INTERNAL MEDICINE
Payer: COMMERCIAL

## 2022-06-18 DIAGNOSIS — R06.02 SOB (SHORTNESS OF BREATH): ICD-10-CM

## 2022-06-18 DIAGNOSIS — U07.1 COVID-19: Primary | ICD-10-CM

## 2022-06-18 DIAGNOSIS — J02.9 SORE THROAT: ICD-10-CM

## 2022-06-18 DIAGNOSIS — U07.1 COVID: ICD-10-CM

## 2022-06-18 DIAGNOSIS — G47.33 OSA (OBSTRUCTIVE SLEEP APNEA): ICD-10-CM

## 2022-06-18 DIAGNOSIS — R50.9 FEVER, UNSPECIFIED FEVER CAUSE: ICD-10-CM

## 2022-06-18 DIAGNOSIS — E87.6 HYPOKALEMIA: ICD-10-CM

## 2022-06-18 LAB
ALBUMIN SERPL-MCNC: 3.4 G/DL (ref 3.5–5)
ALBUMIN/GLOB SERPL: 0.8 {RATIO} (ref 1.1–2.2)
ALP SERPL-CCNC: 82 U/L (ref 45–117)
ALT SERPL-CCNC: 19 U/L (ref 12–78)
ANION GAP SERPL CALC-SCNC: 6 MMOL/L (ref 5–15)
APPEARANCE UR: CLEAR
AST SERPL W P-5'-P-CCNC: 23 U/L (ref 15–37)
BACTERIA URNS QL MICRO: NEGATIVE /HPF
BASOPHILS # BLD: 0 K/UL (ref 0–0.1)
BASOPHILS NFR BLD: 0 % (ref 0–1)
BILIRUB SERPL-MCNC: 0.5 MG/DL (ref 0.2–1)
BILIRUB UR QL: NEGATIVE
BNP SERPL-MCNC: 82 PG/ML
BUN SERPL-MCNC: 11 MG/DL (ref 6–20)
BUN/CREAT SERPL: 11 (ref 12–20)
CA-I BLD-MCNC: 8.7 MG/DL (ref 8.5–10.1)
CHLORIDE SERPL-SCNC: 99 MMOL/L (ref 97–108)
CO2 SERPL-SCNC: 28 MMOL/L (ref 21–32)
COLOR UR: ABNORMAL
COVID-19 RAPID TEST, COVR: DETECTED
CREAT SERPL-MCNC: 0.98 MG/DL (ref 0.55–1.02)
DIFFERENTIAL METHOD BLD: ABNORMAL
EOSINOPHIL # BLD: 0 K/UL (ref 0–0.4)
EOSINOPHIL NFR BLD: 0 % (ref 0–7)
ERYTHROCYTE [DISTWIDTH] IN BLOOD BY AUTOMATED COUNT: 13.5 % (ref 11.5–14.5)
FLUAV AG NPH QL IA: NEGATIVE
FLUBV AG NOSE QL IA: NEGATIVE
GLOBULIN SER CALC-MCNC: 4.2 G/DL (ref 2–4)
GLUCOSE BLD STRIP.AUTO-MCNC: 117 MG/DL (ref 65–117)
GLUCOSE BLD STRIP.AUTO-MCNC: 152 MG/DL (ref 65–117)
GLUCOSE BLD STRIP.AUTO-MCNC: 251 MG/DL (ref 65–117)
GLUCOSE SERPL-MCNC: 157 MG/DL (ref 65–100)
GLUCOSE UR STRIP.AUTO-MCNC: NEGATIVE MG/DL
HCT VFR BLD AUTO: 43.8 % (ref 35–47)
HGB BLD-MCNC: 14.4 G/DL (ref 11.5–16)
HGB UR QL STRIP: ABNORMAL
IMM GRANULOCYTES # BLD AUTO: 0 K/UL (ref 0–0.04)
IMM GRANULOCYTES NFR BLD AUTO: 0 % (ref 0–0.5)
KETONES UR QL STRIP.AUTO: NEGATIVE MG/DL
LEUKOCYTE ESTERASE UR QL STRIP.AUTO: NEGATIVE
LYMPHOCYTES # BLD: 1.5 K/UL (ref 0.8–3.5)
LYMPHOCYTES NFR BLD: 19 % (ref 12–49)
MCH RBC QN AUTO: 28 PG (ref 26–34)
MCHC RBC AUTO-ENTMCNC: 32.9 G/DL (ref 30–36.5)
MCV RBC AUTO: 85 FL (ref 80–99)
MONOCYTES # BLD: 0.9 K/UL (ref 0–1)
MONOCYTES NFR BLD: 11 % (ref 5–13)
MUCOUS THREADS URNS QL MICRO: NEGATIVE /LPF
NEUTS SEG # BLD: 5.7 K/UL (ref 1.8–8)
NEUTS SEG NFR BLD: 70 % (ref 32–75)
NITRITE UR QL STRIP.AUTO: NEGATIVE
NRBC # BLD: 0 K/UL (ref 0–0.01)
NRBC BLD-RTO: 0 PER 100 WBC
PERFORMED BY, TECHID: ABNORMAL
PERFORMED BY, TECHID: ABNORMAL
PERFORMED BY, TECHID: NORMAL
PH UR STRIP: 5 [PH] (ref 5–8)
PLATELET # BLD AUTO: 239 K/UL (ref 150–400)
PMV BLD AUTO: 8.7 FL (ref 8.9–12.9)
POTASSIUM SERPL-SCNC: 3.4 MMOL/L (ref 3.5–5.1)
PROCALCITONIN SERPL-MCNC: <0.05 NG/ML
PROT SERPL-MCNC: 7.6 G/DL (ref 6.4–8.2)
PROT UR STRIP-MCNC: NEGATIVE MG/DL
RBC # BLD AUTO: 5.15 M/UL (ref 3.8–5.2)
RBC #/AREA URNS HPF: ABNORMAL /HPF (ref 0–5)
SODIUM SERPL-SCNC: 133 MMOL/L (ref 136–145)
SP GR UR REFRACTOMETRY: 1.02 (ref 1–1.03)
TROPONIN-HIGH SENSITIVITY: 16 NG/L (ref 0–51)
UA: UC IF INDICATED,UAUC: ABNORMAL
UROBILINOGEN UR QL STRIP.AUTO: 0.1 EU/DL (ref 0.1–1)
WBC # BLD AUTO: 8.1 K/UL (ref 3.6–11)
WBC URNS QL MICRO: ABNORMAL /HPF (ref 0–4)

## 2022-06-18 PROCEDURE — 96361 HYDRATE IV INFUSION ADD-ON: CPT

## 2022-06-18 PROCEDURE — 36415 COLL VENOUS BLD VENIPUNCTURE: CPT

## 2022-06-18 PROCEDURE — 84484 ASSAY OF TROPONIN QUANT: CPT

## 2022-06-18 PROCEDURE — 74011250636 HC RX REV CODE- 250/636: Performed by: INTERNAL MEDICINE

## 2022-06-18 PROCEDURE — 81001 URINALYSIS AUTO W/SCOPE: CPT

## 2022-06-18 PROCEDURE — 74011250637 HC RX REV CODE- 250/637: Performed by: STUDENT IN AN ORGANIZED HEALTH CARE EDUCATION/TRAINING PROGRAM

## 2022-06-18 PROCEDURE — 74011250637 HC RX REV CODE- 250/637: Performed by: INTERNAL MEDICINE

## 2022-06-18 PROCEDURE — 83880 ASSAY OF NATRIURETIC PEPTIDE: CPT

## 2022-06-18 PROCEDURE — 71045 X-RAY EXAM CHEST 1 VIEW: CPT

## 2022-06-18 PROCEDURE — 96375 TX/PRO/DX INJ NEW DRUG ADDON: CPT

## 2022-06-18 PROCEDURE — 93005 ELECTROCARDIOGRAM TRACING: CPT

## 2022-06-18 PROCEDURE — 74011250637 HC RX REV CODE- 250/637: Performed by: NURSE PRACTITIONER

## 2022-06-18 PROCEDURE — 82962 GLUCOSE BLOOD TEST: CPT

## 2022-06-18 PROCEDURE — 96374 THER/PROPH/DIAG INJ IV PUSH: CPT

## 2022-06-18 PROCEDURE — 80053 COMPREHEN METABOLIC PANEL: CPT

## 2022-06-18 PROCEDURE — G0378 HOSPITAL OBSERVATION PER HR: HCPCS

## 2022-06-18 PROCEDURE — 85025 COMPLETE CBC W/AUTO DIFF WBC: CPT

## 2022-06-18 PROCEDURE — 74011000250 HC RX REV CODE- 250: Performed by: NURSE PRACTITIONER

## 2022-06-18 PROCEDURE — 74011636637 HC RX REV CODE- 636/637: Performed by: INTERNAL MEDICINE

## 2022-06-18 PROCEDURE — 74011000250 HC RX REV CODE- 250: Performed by: INTERNAL MEDICINE

## 2022-06-18 PROCEDURE — 87635 SARS-COV-2 COVID-19 AMP PRB: CPT

## 2022-06-18 PROCEDURE — 74011250636 HC RX REV CODE- 250/636: Performed by: NURSE PRACTITIONER

## 2022-06-18 PROCEDURE — 84145 PROCALCITONIN (PCT): CPT

## 2022-06-18 PROCEDURE — 99221 1ST HOSP IP/OBS SF/LOW 40: CPT | Performed by: INTERNAL MEDICINE

## 2022-06-18 PROCEDURE — 99285 EMERGENCY DEPT VISIT HI MDM: CPT

## 2022-06-18 PROCEDURE — 87804 INFLUENZA ASSAY W/OPTIC: CPT

## 2022-06-18 PROCEDURE — 74011000250 HC RX REV CODE- 250: Performed by: STUDENT IN AN ORGANIZED HEALTH CARE EDUCATION/TRAINING PROGRAM

## 2022-06-18 RX ORDER — PROCHLORPERAZINE EDISYLATE 5 MG/ML
5 INJECTION INTRAMUSCULAR; INTRAVENOUS
Status: DISCONTINUED | OUTPATIENT
Start: 2022-06-18 | End: 2022-06-19 | Stop reason: HOSPADM

## 2022-06-18 RX ORDER — LIDOCAINE HYDROCHLORIDE 20 MG/ML
15 SOLUTION OROPHARYNGEAL
Status: COMPLETED | OUTPATIENT
Start: 2022-06-18 | End: 2022-06-18

## 2022-06-18 RX ORDER — ACETAMINOPHEN 325 MG/1
650 TABLET ORAL
Status: DISCONTINUED | OUTPATIENT
Start: 2022-06-18 | End: 2022-06-19 | Stop reason: HOSPADM

## 2022-06-18 RX ORDER — PANTOPRAZOLE SODIUM 40 MG/1
40 TABLET, DELAYED RELEASE ORAL
Status: DISCONTINUED | OUTPATIENT
Start: 2022-06-18 | End: 2022-06-19 | Stop reason: HOSPADM

## 2022-06-18 RX ORDER — ACETAMINOPHEN 650 MG/1
650 SUPPOSITORY RECTAL
Status: DISCONTINUED | OUTPATIENT
Start: 2022-06-18 | End: 2022-06-19 | Stop reason: HOSPADM

## 2022-06-18 RX ORDER — ATORVASTATIN CALCIUM 20 MG/1
20 TABLET, FILM COATED ORAL DAILY
Status: DISCONTINUED | OUTPATIENT
Start: 2022-06-18 | End: 2022-06-19 | Stop reason: HOSPADM

## 2022-06-18 RX ORDER — SODIUM CHLORIDE 0.9 % (FLUSH) 0.9 %
5-40 SYRINGE (ML) INJECTION EVERY 8 HOURS
Status: DISCONTINUED | OUTPATIENT
Start: 2022-06-18 | End: 2022-06-19 | Stop reason: HOSPADM

## 2022-06-18 RX ORDER — DEXTROSE MONOHYDRATE 100 MG/ML
0-250 INJECTION, SOLUTION INTRAVENOUS AS NEEDED
Status: DISCONTINUED | OUTPATIENT
Start: 2022-06-18 | End: 2022-06-19 | Stop reason: HOSPADM

## 2022-06-18 RX ORDER — INSULIN LISPRO 100 [IU]/ML
INJECTION, SOLUTION INTRAVENOUS; SUBCUTANEOUS
Status: DISCONTINUED | OUTPATIENT
Start: 2022-06-18 | End: 2022-06-18

## 2022-06-18 RX ORDER — VALSARTAN 80 MG/1
160 TABLET ORAL DAILY
Status: DISCONTINUED | OUTPATIENT
Start: 2022-06-18 | End: 2022-06-19 | Stop reason: HOSPADM

## 2022-06-18 RX ORDER — MECLIZINE HYDROCHLORIDE 25 MG/1
25 TABLET ORAL
Status: DISCONTINUED | OUTPATIENT
Start: 2022-06-18 | End: 2022-06-19 | Stop reason: HOSPADM

## 2022-06-18 RX ORDER — MAG HYDROX/ALUMINUM HYD/SIMETH 200-200-20
30 SUSPENSION, ORAL (FINAL DOSE FORM) ORAL ONCE
Status: COMPLETED | OUTPATIENT
Start: 2022-06-18 | End: 2022-06-18

## 2022-06-18 RX ORDER — DEXAMETHASONE 4 MG/1
4 TABLET ORAL EVERY 12 HOURS
Status: DISCONTINUED | OUTPATIENT
Start: 2022-06-18 | End: 2022-06-18

## 2022-06-18 RX ORDER — DEXAMETHASONE SODIUM PHOSPHATE 4 MG/ML
4 INJECTION, SOLUTION INTRA-ARTICULAR; INTRALESIONAL; INTRAMUSCULAR; INTRAVENOUS; SOFT TISSUE DAILY
Status: DISCONTINUED | OUTPATIENT
Start: 2022-06-18 | End: 2022-06-19 | Stop reason: HOSPADM

## 2022-06-18 RX ORDER — MECLIZINE HYDROCHLORIDE 25 MG/1
25 TABLET ORAL
Status: COMPLETED | OUTPATIENT
Start: 2022-06-18 | End: 2022-06-18

## 2022-06-18 RX ORDER — INSULIN LISPRO 100 [IU]/ML
INJECTION, SOLUTION INTRAVENOUS; SUBCUTANEOUS
Status: DISCONTINUED | OUTPATIENT
Start: 2022-06-18 | End: 2022-06-19 | Stop reason: HOSPADM

## 2022-06-18 RX ORDER — CODEINE PHOSPHATE AND GUAIFENESIN 10; 100 MG/5ML; MG/5ML
5 SOLUTION ORAL
Status: DISCONTINUED | OUTPATIENT
Start: 2022-06-18 | End: 2022-06-19 | Stop reason: HOSPADM

## 2022-06-18 RX ORDER — DILTIAZEM HYDROCHLORIDE 120 MG/1
120 CAPSULE, EXTENDED RELEASE ORAL DAILY
Status: DISCONTINUED | OUTPATIENT
Start: 2022-06-18 | End: 2022-06-18 | Stop reason: SDUPTHER

## 2022-06-18 RX ORDER — ACETAMINOPHEN 500 MG
1000 TABLET ORAL ONCE
Status: COMPLETED | OUTPATIENT
Start: 2022-06-18 | End: 2022-06-18

## 2022-06-18 RX ORDER — SODIUM CHLORIDE 0.9 % (FLUSH) 0.9 %
5-40 SYRINGE (ML) INJECTION AS NEEDED
Status: DISCONTINUED | OUTPATIENT
Start: 2022-06-18 | End: 2022-06-19 | Stop reason: HOSPADM

## 2022-06-18 RX ORDER — SODIUM CHLORIDE 9 MG/ML
100 INJECTION, SOLUTION INTRAVENOUS CONTINUOUS
Status: DISPENSED | OUTPATIENT
Start: 2022-06-18 | End: 2022-06-18

## 2022-06-18 RX ORDER — POLYETHYLENE GLYCOL 3350 17 G/17G
17 POWDER, FOR SOLUTION ORAL DAILY PRN
Status: DISCONTINUED | OUTPATIENT
Start: 2022-06-18 | End: 2022-06-19 | Stop reason: HOSPADM

## 2022-06-18 RX ORDER — BENZONATATE 100 MG/1
100 CAPSULE ORAL
Status: DISCONTINUED | OUTPATIENT
Start: 2022-06-18 | End: 2022-06-19 | Stop reason: HOSPADM

## 2022-06-18 RX ORDER — DILTIAZEM HYDROCHLORIDE 120 MG/1
120 CAPSULE, COATED, EXTENDED RELEASE ORAL DAILY
Status: DISCONTINUED | OUTPATIENT
Start: 2022-06-18 | End: 2022-06-19 | Stop reason: HOSPADM

## 2022-06-18 RX ORDER — ONDANSETRON 2 MG/ML
4 INJECTION INTRAMUSCULAR; INTRAVENOUS ONCE
Status: COMPLETED | OUTPATIENT
Start: 2022-06-18 | End: 2022-06-18

## 2022-06-18 RX ORDER — INSULIN GLARGINE 100 [IU]/ML
50 INJECTION, SOLUTION SUBCUTANEOUS
Status: DISCONTINUED | OUTPATIENT
Start: 2022-06-19 | End: 2022-06-19 | Stop reason: HOSPADM

## 2022-06-18 RX ORDER — MAGNESIUM SULFATE 100 %
4 CRYSTALS MISCELLANEOUS AS NEEDED
Status: DISCONTINUED | OUTPATIENT
Start: 2022-06-18 | End: 2022-06-19 | Stop reason: HOSPADM

## 2022-06-18 RX ORDER — DEXAMETHASONE SODIUM PHOSPHATE 100 MG/10ML
4 INJECTION INTRAMUSCULAR; INTRAVENOUS DAILY
Status: DISCONTINUED | OUTPATIENT
Start: 2022-06-19 | End: 2022-06-18

## 2022-06-18 RX ADMIN — ACETAMINOPHEN 1000 MG: 500 TABLET ORAL at 03:23

## 2022-06-18 RX ADMIN — DEXAMETHASONE SODIUM PHOSPHATE 4 MG: 4 INJECTION, SOLUTION INTRA-ARTICULAR; INTRALESIONAL; INTRAMUSCULAR; INTRAVENOUS; SOFT TISSUE at 19:12

## 2022-06-18 RX ADMIN — LIDOCAINE HYDROCHLORIDE 15 ML: 20 SOLUTION ORAL; TOPICAL at 06:33

## 2022-06-18 RX ADMIN — DRONEDARONE 400 MG: 400 TABLET, FILM COATED ORAL at 08:00

## 2022-06-18 RX ADMIN — MECLIZINE HYDROCHLORIDE 25 MG: 25 TABLET ORAL at 03:23

## 2022-06-18 RX ADMIN — BENZOCAINE AND MENTHOL 1 LOZENGE: 15; 3.6 LOZENGE ORAL at 22:04

## 2022-06-18 RX ADMIN — LIDOCAINE HYDROCHLORIDE 15 ML: 20 SOLUTION ORAL; TOPICAL at 02:06

## 2022-06-18 RX ADMIN — SODIUM CHLORIDE, PRESERVATIVE FREE 5 ML: 5 INJECTION INTRAVENOUS at 14:00

## 2022-06-18 RX ADMIN — ONDANSETRON 4 MG: 2 INJECTION INTRAMUSCULAR; INTRAVENOUS at 02:00

## 2022-06-18 RX ADMIN — SODIUM CHLORIDE 1000 ML: 9 INJECTION, SOLUTION INTRAVENOUS at 02:00

## 2022-06-18 RX ADMIN — ALUMINUM HYDROXIDE, MAGNESIUM HYDROXIDE, AND SIMETHICONE 30 ML: 200; 200; 20 SUSPENSION ORAL at 06:33

## 2022-06-18 RX ADMIN — DILTIAZEM HYDROCHLORIDE 120 MG: 120 CAPSULE, COATED, EXTENDED RELEASE ORAL at 10:04

## 2022-06-18 RX ADMIN — SODIUM CHLORIDE, PRESERVATIVE FREE 10 ML: 5 INJECTION INTRAVENOUS at 22:56

## 2022-06-18 RX ADMIN — SODIUM CHLORIDE 100 ML/HR: 9 INJECTION, SOLUTION INTRAVENOUS at 04:11

## 2022-06-18 RX ADMIN — ATORVASTATIN CALCIUM 20 MG: 20 TABLET, FILM COATED ORAL at 09:00

## 2022-06-18 RX ADMIN — VALSARTAN 160 MG: 80 TABLET, FILM COATED ORAL at 09:00

## 2022-06-18 RX ADMIN — BENZOCAINE AND MENTHOL 1 LOZENGE: 15; 3.6 LOZENGE ORAL at 16:54

## 2022-06-18 RX ADMIN — METHYLPREDNISOLONE SODIUM SUCCINATE 40 MG: 40 INJECTION, POWDER, FOR SOLUTION INTRAMUSCULAR; INTRAVENOUS at 02:06

## 2022-06-18 RX ADMIN — RIVAROXABAN 10 MG: 10 TABLET, FILM COATED ORAL at 09:00

## 2022-06-18 RX ADMIN — GUAIFENESIN AND CODEINE PHOSPHATE 5 ML: 10; 100 LIQUID ORAL at 04:16

## 2022-06-18 RX ADMIN — DRONEDARONE 400 MG: 400 TABLET, FILM COATED ORAL at 17:00

## 2022-06-18 RX ADMIN — INSULIN LISPRO 2 UNITS: 100 INJECTION, SOLUTION INTRAVENOUS; SUBCUTANEOUS at 11:30

## 2022-06-18 RX ADMIN — SODIUM CHLORIDE, PRESERVATIVE FREE 10 ML: 5 INJECTION INTRAVENOUS at 06:33

## 2022-06-18 RX ADMIN — INSULIN LISPRO 6 UNITS: 100 INJECTION, SOLUTION INTRAVENOUS; SUBCUTANEOUS at 07:30

## 2022-06-18 RX ADMIN — PANTOPRAZOLE SODIUM 40 MG: 40 TABLET, DELAYED RELEASE ORAL at 07:30

## 2022-06-18 NOTE — ED PROVIDER NOTES
EMERGENCY DEPARTMENT HISTORY AND PHYSICAL EXAM      Date: 6/18/2022  Patient Name: Janis Escobedo    History of Presenting Illness     Chief Complaint   Patient presents with    Vomiting       History Provided By: Patient    HPI: Janis Escobedo, 59 y.o. female with a past medical history significant diabetes, hypertension, hyperlipidemia, obesity and atrial fibrillation, LAKESHIA presents to the ED with cc of sore throat, bilateral ear pain and fullness, cough, positional dizziness, N/V/D, shortness of breath and overall feeling of unwellness progressively worsening over the last three days. Specifically denies chest pain, abdominal pain, back pain, urinary symptoms, falls, confusion, ataxia, difficulty regulating blood sugar, or peripheral edema. She was evaluated 2 weeks ago for diarrhea and had stool testing at that time which she and her family member do not know the results but they do not believe there were any findings. Dizziness is described as positional with head turning and as a lightheadedness with nausea and vomiting that resolves when she is laying still. There is no nystagmus, facial drooping, abnormal pupillary response, unilateral weakness or pronator drift on bedside neuro exam.  Positive bulging of tympanic membranes and throat without lesions, there is no adenopathy or tonsillar swelling only mild erythema and there is no muffled voice. Shortness of breath is mostly exertional.  Productive cough of thick yellow sputum. There are no other complaints, changes, or physical findings at this time. PCP: Марина Rodriguez MD    No current facility-administered medications on file prior to encounter. Current Outpatient Medications on File Prior to Encounter   Medication Sig Dispense Refill    valsartan (DIOVAN) 160 mg tablet Take  by mouth daily.  dulaglutide (Trulicity) 3 KG/2.2 mL pnij by SubCUTAneous route.       insulin glargine U-300 conc (Toujeo Max U-300 SoloStar) 300 unit/mL (3 mL) inpn by SubCUTAneous route.  dronedarone (Multaq) tab tablet Take 400 mg by mouth two (2) times daily (with meals).  dilTIAZem ER (DILACOR XR) 120 mg capsule Take  by mouth daily.  potassium chloride SA (MICRO-K) 10 mEq capsule Take 10 mEq by mouth two (2) times a day.  furosemide (Lasix) 40 mg tablet Take  by mouth daily.  Omeprazole delayed release (PRILOSEC D/R) 20 mg tablet Take 20 mg by mouth daily.  atorvastatin (LIPITOR) 20 mg tablet Take  by mouth daily.  rivaroxaban (XARELTO) 10 mg tablet Take 10 mg by mouth daily.  FLUoxetine (PROZAC) 40 mg capsule TAKE 1 CAPSULE BY MOUTH ONCE A DAY  0    glipiZIDE (GLUCOTROL) 5 mg tablet TAKE 1 TABLET ONCE A DAY ORALLY 30  0       Past History     Past Medical History:  Past Medical History:   Diagnosis Date    Atrial fibrillation ([address]     Diabetes [facility] [address]     Essential hypertension     Hyperlipidemia     Skin cancer     Sun-damaged skin     Sunburn, blistering        Past Surgical History:  Past Surgical History:   Procedure Laterality Date    HX CHOLECYSTECTOMY      2014    HX MOHS PROCEDURES  08/24/2017    BCC L ala by Dr. Maryjane Subramanian HX UROLOGICAL  04/01/2022    cystscopy       Family History:  Family History   Problem Relation Age of Onset    Diabetes Mother     Cancer Mother     Lung Disease Mother     Heart Disease Father     Diabetes Father     Cancer Paternal Aunt     Cancer Paternal Grandmother        Social History:  Social History     Tobacco Use    Smoking status: Never Smoker    Smokeless tobacco: Never Used   Vaping Use    Vaping Use: Never used   Substance Use Topics    Alcohol use: Not Currently    Drug use: Never       Allergies:  No Known Allergies      Review of Systems     Review of Systems   Constitutional: Positive for appetite change, fatigue and fever. HENT: Positive for congestion, ear pain, postnasal drip and sore throat.  Negative for hearing loss, trouble swallowing and voice change. Eyes: Negative for photophobia, pain and visual disturbance. Respiratory: Positive for cough and shortness of breath. Cardiovascular: Negative. Gastrointestinal: Positive for diarrhea, nausea and vomiting. Negative for abdominal pain and constipation. Genitourinary: Negative. Musculoskeletal: Positive for myalgias. Negative for back pain. Skin: Negative. Neurological: Positive for dizziness and light-headedness. Negative for tremors, syncope, facial asymmetry, speech difficulty, weakness, numbness and headaches. Hematological: Negative. Psychiatric/Behavioral: Negative. All other systems reviewed and are negative. Physical Exam     Physical Exam  Vitals and nursing note reviewed. Constitutional:       General: She is not in acute distress. Appearance: She is obese. She is ill-appearing. She is not toxic-appearing or diaphoretic. HENT:      Head: Normocephalic and atraumatic. Right Ear: Hearing, ear canal and external ear normal. Tenderness present. No mastoid tenderness. Tympanic membrane is bulging. Tympanic membrane is not erythematous. Left Ear: Hearing, ear canal and external ear normal. Tenderness present. No mastoid tenderness. Tympanic membrane is bulging. Tympanic membrane is not erythematous. Nose: Congestion present. Mouth/Throat:      Mouth: Mucous membranes are moist.      Palate: No lesions. Pharynx: Oropharynx is clear. Uvula midline. Posterior oropharyngeal erythema present. No oropharyngeal exudate or uvula swelling. Tonsils: No tonsillar exudate. 0 on the right. 0 on the left. Eyes:      General: Vision grossly intact. Extraocular Movements: Extraocular movements intact. Conjunctiva/sclera: Conjunctivae normal.   Cardiovascular:      Rate and Rhythm: Normal rate and regular rhythm. Pulses: Normal pulses. Heart sounds: Normal heart sounds.    Pulmonary:      Effort: Pulmonary effort is normal. No respiratory distress. Breath sounds: Normal air entry. No stridor. Examination of the right-lower field reveals decreased breath sounds. Examination of the left-lower field reveals decreased breath sounds. Decreased breath sounds present. No wheezing, rhonchi or rales. Abdominal:      General: Bowel sounds are normal. There is no distension. Palpations: Abdomen is soft. Tenderness: There is no abdominal tenderness. Musculoskeletal:         General: Normal range of motion. Cervical back: Normal range of motion and neck supple. No tenderness. Right lower leg: No edema. Left lower leg: No edema. Lymphadenopathy:      Cervical: No cervical adenopathy. Skin:     General: Skin is warm and dry. Capillary Refill: Capillary refill takes less than 2 seconds. Neurological:      General: No focal deficit present. Mental Status: She is alert and oriented to person, place, and time. Cranial Nerves: Cranial nerves are intact. Sensory: Sensation is intact. Motor: Motor function is intact. Lab and Diagnostic Study Results     Labs -     Recent Results (from the past 12 hour(s))   CBC WITH AUTOMATED DIFF    Collection Time: 06/18/22  1:49 AM   Result Value Ref Range    WBC 8.1 3.6 - 11.0 K/uL    RBC 5.15 3.80 - 5.20 M/uL    HGB 14.4 11.5 - 16.0 g/dL    HCT 43.8 35.0 - 47.0 %    MCV 85.0 80.0 - 99.0 FL    MCH 28.0 26.0 - 34.0 PG    MCHC 32.9 30.0 - 36.5 g/dL    RDW 13.5 11.5 - 14.5 %    PLATELET 864 316 - 967 K/uL    MPV 8.7 (L) 8.9 - 12.9 FL    NRBC 0.0 0.0  WBC    ABSOLUTE NRBC 0.00 0.00 - 0.01 K/uL    NEUTROPHILS 70 32 - 75 %    LYMPHOCYTES 19 12 - 49 %    MONOCYTES 11 5 - 13 %    EOSINOPHILS 0 0 - 7 %    BASOPHILS 0 0 - 1 %    IMMATURE GRANULOCYTES 0 0 - 0.5 %    ABS. NEUTROPHILS 5.7 1.8 - 8.0 K/UL    ABS. LYMPHOCYTES 1.5 0.8 - 3.5 K/UL    ABS. MONOCYTES 0.9 0.0 - 1.0 K/UL    ABS. EOSINOPHILS 0.0 0.0 - 0.4 K/UL    ABS.  BASOPHILS 0.0 0.0 - 0.1 K/UL ABS. IMM. GRANS. 0.0 0.00 - 0.04 K/UL    DF AUTOMATED     METABOLIC PANEL, COMPREHENSIVE    Collection Time: 06/18/22  1:49 AM   Result Value Ref Range    Sodium 133 (L) 136 - 145 mmol/L    Potassium 3.4 (L) 3.5 - 5.1 mmol/L    Chloride 99 97 - 108 mmol/L    CO2 28 21 - 32 mmol/L    Anion gap 6 5 - 15 mmol/L    Glucose 157 (H) 65 - 100 mg/dL    BUN 11 6 - 20 mg/dL    Creatinine 0.98 0.55 - 1.02 mg/dL    BUN/Creatinine ratio 11 (L) 12 - 20      GFR est AA >60 >60 ml/min/1.73m2    GFR est non-AA 57 (L) >60 ml/min/1.73m2    Calcium 8.7 8.5 - 10.1 mg/dL    Bilirubin, total 0.5 0.2 - 1.0 mg/dL    AST (SGOT) 23 15 - 37 U/L    ALT (SGPT) 19 12 - 78 U/L    Alk. phosphatase 82 45 - 117 U/L    Protein, total 7.6 6.4 - 8.2 g/dL    Albumin 3.4 (L) 3.5 - 5.0 g/dL    Globulin 4.2 (H) 2.0 - 4.0 g/dL    A-G Ratio 0.8 (L) 1.1 - 2.2     TROPONIN-HIGH SENSITIVITY    Collection Time: 06/18/22  1:50 AM   Result Value Ref Range    Troponin-High Sensitivity 16 0 - 51 ng/L   INFLUENZA A & B AG (RAPID TEST)    Collection Time: 06/18/22  1:54 AM   Result Value Ref Range    Influenza A Antigen Negative Negative      Influenza B Antigen Negative Negative     COVID-19 RAPID TEST    Collection Time: 06/18/22  1:54 AM   Result Value Ref Range    COVID-19 rapid test DETECTED (A) Not Detected     NT-PRO BNP    Collection Time: 06/18/22  2:30 AM   Result Value Ref Range    NT pro-BNP 82 <125 pg/mL       Radiologic Studies -   @lastxrresult@  CT Results  (Last 48 hours)    None        CXR Results  (Last 48 hours)               06/18/22 0222  XR CHEST PORT Final result    Impression:      Probable left basilar opacity which may represent crowding of lung markings or   atelectasis, although pneumonia should be excluded clinically. Follow-up as   clinically indicated. Narrative:  Chest one view       INDICATION: Chest pain       FINDINGS:       Cardiac silhouette appears slightly enlarged. Mild pulmonary vascular prominence   without diamante edema. There may be mild peribronchial thickening. Slightly   elevated right hemidiaphragm. Probable left basilar opacity. No gross pleural   effusions. No pneumothorax. No displaced fracture in the visualized bony   structures. EKG: Ventricular rate 84 bpm, MT interval 132 ms, QRS duration 80 ms,  ms,  ms there are no previous tracings available for comparison, normal sinus rhythm, nonspecific ST and T waves changes, abnormal EKG. Reviewed by Dr. Malcolm Bergeron am the first provider for this patient. - I reviewed the vital signs, available nursing notes, past medical history, past surgical history, family history and social history. - Initial assessment performed. The patients presenting problems have been discussed, and they are in agreement with the care plan formulated and outlined with them. I have encouraged them to ask questions as they arise throughout their visit. Vital Signs-Reviewed the patient's vital signs. Patient Vitals for the past 12 hrs:   Temp Pulse Resp BP SpO2   06/18/22 0330 100.3 °F (37.9 °C) 82 25 (!) 130/59 94 %   06/18/22 0230 -- 87 21 (!) 160/75 94 %   06/18/22 0133 100.3 °F (37.9 °C) 82 20 (!) 158/80 95 %       Records Reviewed: Nursing Notes, Old Medical Records, Previous Radiology Studies and Previous Laboratory Studies    The patient presents with multiple complaints with a differential diagnosis of COVID, influenza, ACS, DKA, viral syndrome, heart failure exacerbation, COPD exacerbation, enteritis, Labyrinthitis, Cerebellar infarct, dehydration, vertigo, pharyngitis, tonsillitis, pulmonary edema      ED Course:     ED Course as of 06/18/22 0338   Sat Jun 18, 2022   0204 60 y/o female presents with multiple symptoms. Positional dizziness with no acute neurological deficits or findings on exam with associated ear symptoms and upper respiratory symptoms likely vertigo/labrynthitis.  Work up with basic and cardiac labs, ECG, CXR, urinalysis. IVF and fever/symptoms management with lidocaine, steroid, antiemetic and when nausea and throat pain improved acetaminophen and meclizine. No abdominal pain or chest pain. No evidence of sepsis on initial exam. COVID and flu testing. [KR]   0210 CBC unremarkable [KR]   0224 COVID-19 RAPID TEST(!):    COVID-19 rapid test DETECTED(!) [KR]   0225 INFLUENZA A & B AG (RAPID TEST):    Influenza A Antigen Negative   Influenza B Antigen Negative [KR]   0227 Chemistry with mild hypokalemia and hyponatremia but no evidence of DKA, EMILIANA, or hepatic dysfunction. [KR]   0248 TROPONIN-HIGH SENSITIVITY:    Troponin-High Sensitivity 16 [KR]   0309 PRO-BNP:    NT pro-BNP 82 [KR]   0319 Consulted with hospitalist and he will accept for observation admission. Nausea improved and will take oral meds now. Awaiting procalcitonin prior to ordering antibiotic which was agreed upon by Dr. Silvia Fish. Has LAKESHIA but when awake no hypoxia requiring supplemental oxygen. Hospitalist to eval and assume care  [KR]      ED Course User Index  [KR] Karlos Piña NP       Provider Notes (Medical Decision Making):     MDM  Number of Diagnoses or Management Options  COVID-19: new, needed workup  Fever, unspecified fever cause: new, needed workup  Hypokalemia: new, needed workup  LAKESHIA (obstructive sleep apnea): new, no workup  SOB (shortness of breath): new, needed workup     Amount and/or Complexity of Data Reviewed  Clinical lab tests: ordered and reviewed  Tests in the radiology section of CPT®: ordered and reviewed  Review and summarize past medical records: yes  Discuss the patient with other providers: yes  Independent visualization of images, tracings, or specimens: yes    Patient Progress  Patient progress: stable         Disposition   Disposition: Condition stable  Admitted to Floor Medical Floor the case was discussed with the admitting physician Dr. Silvia Fihs and Hospitalist services    Admitted      Diagnosis     Clinical Impression:   1. COVID-19    2. SOB (shortness of breath)    3. LAKESHIA (obstructive sleep apnea)    4. Fever, unspecified fever cause    5. Hypokalemia        Attestations:    Brunilda Chlids NP    Please note that this dictation was completed with [employer], the computer voice recognition software. Quite often unanticipated grammatical, syntax, homophones, and other interpretive errors are inadvertently transcribed by the computer software. Please disregard these errors. Please excuse any errors that have escaped final proofreading. Thank you.

## 2022-06-18 NOTE — H&P
History and Physical    Patient: Cat Zaldivar MRN: [id number]  SSN: [id number]    YOB: 1960  Age: 59 y.o. Sex: female      Subjective:      Cat Zaldivar is a 59 y.o. female with PMH of atrial fibrillation, diabetes, hypertension and HLP. She presented to the ED with chief complaint of sore throat and generalized malaise. Associated with ear fullness, productive cough, dizziness, nausea, vomiting, diarrhea and myalgia. Her symptoms has progressively worsened for the past 3 days. Dizziness is described as positional with head turning and as a lightheadedness with nausea and vomiting that resolves when she is laying still. Shortness of breath is mostly exertional, not hypoxic. Chest X-ray showed left basilar opacity but WBC and procalcitonin not elevated. Afebrile. Past Medical History:   Diagnosis Date    Atrial fibrillation ([address]     Diabetes ([address]     Essential hypertension     Hyperlipidemia     Skin cancer     Sun-damaged skin     Sunburn, blistering      Past Surgical History:   Procedure Laterality Date    HX CHOLECYSTECTOMY      2014    HX MOHS PROCEDURES  08/24/2017    BCC L ala by Dr. Norberto Bucio  04/01/2022    cystscopy      Family History   Problem Relation Age of Onset    Diabetes Mother     Cancer Mother     Lung Disease Mother     Heart Disease Father     Diabetes Father     Cancer Paternal Aunt     Cancer Paternal Grandmother      Social History     Tobacco Use    Smoking status: Never Smoker    Smokeless tobacco: Never Used   Substance Use Topics    Alcohol use: Not Currently      Prior to Admission medications    Medication Sig Start Date End Date Taking? Authorizing Provider   valsartan (DIOVAN) 160 mg tablet Take  by mouth daily. Provider, Historical   dulaglutide (Trulicity) 3 BH/4.6 mL pnij by SubCUTAneous route.     Provider, Historical   insulin glargine U-300 conc (Toujeo Max U-300 SoloStar) 300 unit/mL (3 mL) inpn by SubCUTAneous route.    Provider, Historical   dronedarone (Multaq) tab tablet Take 400 mg by mouth two (2) times daily (with meals). Provider, Historical   dilTIAZem ER (DILACOR XR) 120 mg capsule Take  by mouth daily. Provider, Historical   potassium chloride SA (MICRO-K) 10 mEq capsule Take 10 mEq by mouth two (2) times a day. Provider, Historical   furosemide (Lasix) 40 mg tablet Take  by mouth daily. Provider, Historical   Omeprazole delayed release (PRILOSEC D/R) 20 mg tablet Take 20 mg by mouth daily. Provider, Historical   atorvastatin (LIPITOR) 20 mg tablet Take  by mouth daily. Provider, Historical   rivaroxaban (XARELTO) 10 mg tablet Take 10 mg by mouth daily. Provider, Historical   FLUoxetine (PROZAC) 40 mg capsule TAKE 1 CAPSULE BY MOUTH ONCE A DAY 8/13/17   Provider, Historical   glipiZIDE (GLUCOTROL) 5 mg tablet TAKE 1 TABLET ONCE A DAY ORALLY 30 8/13/17   Provider, Historical        No Known Allergies    Review of Systems:   Constitutional: See HPI. Eyes: No visual disturbance  Ears, Nose, Mouth, Throat, and Face: No nasal congestion, No sore throat  Respiratory: See HPI. No wheezing  Cardiovascular: No chest pain, No lower extremity edema, No Palpitations   Gastrointestinal: See HPI. Genitourinary: No frequency, No dysuria, No hematuria  Integument/Breast: No rash, No skin lesion(s), No dryness  Musculoskeletal: No arthralgias, No neck pain, No back pain  Neurological: No headaches, No dizziness, No confusion,  No seizures  Behavioral/Psychiatric: No anxiety, No depression      Objective:     Vitals:    06/18/22 0133 06/18/22 0230 06/18/22 0330   BP: (!) 158/80 (!) 160/75 (!) 130/59   Pulse: 82 87 82   Resp: 20 21 25   Temp: 100.3 °F (37.9 °C)  100.3 °F (37.9 °C)   SpO2: 95% 94% 94%   Weight: 136.1 kg (300 lb)     Height: 5' 6\" (1.676 m)          Physical Exam:   General: alert, cooperative, no distress  Eye: conjunctivae/corneas clear. PERRL, EOM's intact.    Throat and Neck: normal and no erythema or exudates noted. No mass   Lung: clear to auscultation bilaterally  Heart: regular rate and rhythm,   Abdomen: soft, non-tender. Bowel sounds normal. No masses,  Extremities:  able to move all extremities normal, atraumatic  Skin: Normal.  Neurologic: AOx3. Motor function and sensation grossly intact. Psychiatric: non focal    Recent Results (from the past 24 hour(s))   CBC WITH AUTOMATED DIFF    Collection Time: 06/18/22  1:49 AM   Result Value Ref Range    WBC 8.1 3.6 - 11.0 K/uL    RBC 5.15 3.80 - 5.20 M/uL    HGB 14.4 11.5 - 16.0 g/dL    HCT 43.8 35.0 - 47.0 %    MCV 85.0 80.0 - 99.0 FL    MCH 28.0 26.0 - 34.0 PG    MCHC 32.9 30.0 - 36.5 g/dL    RDW 13.5 11.5 - 14.5 %    PLATELET 204 113 - 763 K/uL    MPV 8.7 (L) 8.9 - 12.9 FL    NRBC 0.0 0.0  WBC    ABSOLUTE NRBC 0.00 0.00 - 0.01 K/uL    NEUTROPHILS 70 32 - 75 %    LYMPHOCYTES 19 12 - 49 %    MONOCYTES 11 5 - 13 %    EOSINOPHILS 0 0 - 7 %    BASOPHILS 0 0 - 1 %    IMMATURE GRANULOCYTES 0 0 - 0.5 %    ABS. NEUTROPHILS 5.7 1.8 - 8.0 K/UL    ABS. LYMPHOCYTES 1.5 0.8 - 3.5 K/UL    ABS. MONOCYTES 0.9 0.0 - 1.0 K/UL    ABS. EOSINOPHILS 0.0 0.0 - 0.4 K/UL    ABS. BASOPHILS 0.0 0.0 - 0.1 K/UL    ABS. IMM. GRANS. 0.0 0.00 - 0.04 K/UL    DF AUTOMATED     METABOLIC PANEL, COMPREHENSIVE    Collection Time: 06/18/22  1:49 AM   Result Value Ref Range    Sodium 133 (L) 136 - 145 mmol/L    Potassium 3.4 (L) 3.5 - 5.1 mmol/L    Chloride 99 97 - 108 mmol/L    CO2 28 21 - 32 mmol/L    Anion gap 6 5 - 15 mmol/L    Glucose 157 (H) 65 - 100 mg/dL    BUN 11 6 - 20 mg/dL    Creatinine 0.98 0.55 - 1.02 mg/dL    BUN/Creatinine ratio 11 (L) 12 - 20      GFR est AA >60 >60 ml/min/1.73m2    GFR est non-AA 57 (L) >60 ml/min/1.73m2    Calcium 8.7 8.5 - 10.1 mg/dL    Bilirubin, total 0.5 0.2 - 1.0 mg/dL    AST (SGOT) 23 15 - 37 U/L    ALT (SGPT) 19 12 - 78 U/L    Alk.  phosphatase 82 45 - 117 U/L    Protein, total 7.6 6.4 - 8.2 g/dL    Albumin 3.4 (L) 3.5 - 5.0 g/dL Globulin 4.2 (H) 2.0 - 4.0 g/dL    A-G Ratio 0.8 (L) 1.1 - 2.2     TROPONIN-HIGH SENSITIVITY    Collection Time: 06/18/22  1:50 AM   Result Value Ref Range    Troponin-High Sensitivity 16 0 - 51 ng/L   INFLUENZA A & B AG (RAPID TEST)    Collection Time: 06/18/22  1:54 AM   Result Value Ref Range    Influenza A Antigen Negative Negative      Influenza B Antigen Negative Negative     COVID-19 RAPID TEST    Collection Time: 06/18/22  1:54 AM   Result Value Ref Range    COVID-19 rapid test DETECTED (A) Not Detected     NT-PRO BNP    Collection Time: 06/18/22  2:30 AM   Result Value Ref Range    NT pro-BNP 82 <125 pg/mL   PROCALCITONIN    Collection Time: 06/18/22  2:50 AM   Result Value Ref Range    Procalcitonin <0.05 (H) 0 ng/mL       XR Results (maximum last 3): Results from [place] encounter on 06/18/22    XR CHEST PORT    Narrative  Chest one view    INDICATION: Chest pain    FINDINGS:    Cardiac silhouette appears slightly enlarged. Mild pulmonary vascular prominence  without diamante edema. There may be mild peribronchial thickening. Slightly  elevated right hemidiaphragm. Probable left basilar opacity. No gross pleural  effusions. No pneumothorax. No displaced fracture in the visualized bony  structures. Impression  Probable left basilar opacity which may represent crowding of lung markings or  atelectasis, although pneumonia should be excluded clinically. Follow-up as  clinically indicated. CT Results (maximum last 3):   Results from [place] encounter on 02/11/22    CT ABD PELV W WO CONT    Narrative  CT abdomen and pelvis without and with intravenous contrast, 100 cc [id number]    Dose reduction: All CT scans at this facility are performed using dose reduction  optimization techniques as appropriate to a performed exam including the  following: Automated exposure control, adjustments of the mA and/or kV according  to patient size, or use of iterative reconstruction technique. INDICATION: Painless hematuria    FINDINGS:    No airspace disease in the lung bases. Liver and spleen are unremarkable. Pancreas is somewhat atrophic. No aneurysm of abdominal aorta. Mild  atherosclerosis. Cholecystectomy. No urinary stone or hydronephrosis or discrete renal mass on either side. Subcentimeter lesion in the lower pole of left kidney and probable tiny  additional bilateral renal lesions are too small to characterize, may represent  cysts or other. Excretion of contrast is seen from the kidneys on the delayed  images down to bladder. No obvious mass in the urinary bladder, although it is  only partially opacified. No bowel obstruction. Few colonic diverticula without acute diverticulitis. No  inflammatory changes to suggest appendicitis. Uterus and ovaries appear grossly  unremarkable based on CT criteria. Small periumbilical defect containing fat. No  free fluid in the pelvis. No acute fracture in the visualized bony structures. Impression  No urinary stone or discrete mass. Renal lesions are too small to characterize,  may represent cysts or other. Urology consult and follow-up as clinically  indicated. Few colonic diverticula without acute diverticulitis. Please see full report. MRI Results (maximum last 3): No results found for this or any previous visit. Nuclear Medicine Results (maximum last 3): No results found for this or any previous visit. US Results (maximum last 3): No results found for this or any previous visit. Assessment and plan:   # COVID infection  - With possible pneumonia of left basal lung  - Symptomatic treatment   - IVF infusion  - ID consult for anti-viral recommendations. # Diabetes  - Lantus 50u qHS. Taking 60u qHS at home. - POC + correctional insulin     # Hypertension  - Continue home medications. - -Hold lasix in the setting of dehydration. # Atrial fibrillation  - Continue home medications.      # Full code by default, need further clarification    # Medication list reviewed on Epic and/or outside documentation. Not reviewed with patient.       Signed By: Favian Sood MD     June 18, 2022

## 2022-06-18 NOTE — PROGRESS NOTES
Hospitalist Progress Note    Subjective:   Daily Progress Note: 6/18/2022 11:27 AM    Hospital Course:  Felipe Cedillo is a 59 y.o. female with PMH of atrial fibrillation on Xarelto, diabetes, hypertension and HLP. She presented to the ED with chief complaint of sore throat and generalized malaise. Associated with ear fullness, productive cough, dizziness, nausea, vomiting, diarrhea and myalgia. Her symptoms has progressively worsened for the past 3 days. Dizziness is described as positional with head turning and as a lightheadedness with nausea and vomiting that resolves when she is laying still. Shortness of breath is mostly exertional, not hypoxic. In the ED, hemodynamically stable other than slightly elevated blood pressure. Chest X-ray showed left basilar opacity but WBC and procalcitonin not elevated. Afebrile, on room air. Patient admitted for observation. Subjective:    Patient seen and examined at bedside. Reports SOB on exertion.      Current Facility-Administered Medications   Medication Dose Route Frequency    atorvastatin (LIPITOR) tablet 20 mg  20 mg Oral DAILY    dronedarone (MULTAQ) tablet tab 400 mg  400 mg Oral BID WITH MEALS    pantoprazole (PROTONIX) tablet 40 mg  40 mg Oral ACB    rivaroxaban (XARELTO) tablet 10 mg  10 mg Oral DAILY    valsartan (DIOVAN) tablet 160 mg  160 mg Oral DAILY    sodium chloride (NS) flush 5-40 mL  5-40 mL IntraVENous Q8H    sodium chloride (NS) flush 5-40 mL  5-40 mL IntraVENous PRN    acetaminophen (TYLENOL) tablet 650 mg  650 mg Oral Q6H PRN    Or    acetaminophen (TYLENOL) suppository 650 mg  650 mg Rectal Q6H PRN    polyethylene glycol (MIRALAX) packet 17 g  17 g Oral DAILY PRN    0.9% sodium chloride infusion  100 mL/hr IntraVENous CONTINUOUS    meclizine (ANTIVERT) tablet 25 mg  25 mg Oral Q6H PRN    guaiFENesin-codeine (ROBITUSSIN AC) 100-10 mg/5 mL solution 5 mL  5 mL Oral Q4H PRN    glucose chewable tablet 16 g  4 Tablet Oral PRN    glucagon (GLUCAGEN) injection 1 mg  1 mg IntraMUSCular PRN    dextrose 10% infusion 0-250 mL  0-250 mL IntraVENous PRN    insulin lispro (HUMALOG) injection   SubCUTAneous TIDAC    [START ON 6/19/2022] insulin glargine (LANTUS) injection 50 Units  50 Units SubCUTAneous QHS    prochlorperazine (COMPAZINE) injection 5 mg  5 mg IntraVENous Q4H PRN    dilTIAZem ER (CARDIZEM CD) capsule 120 mg  120 mg Oral DAILY     Current Outpatient Medications   Medication Sig    valsartan (DIOVAN) 160 mg tablet Take  by mouth daily.  dulaglutide (Trulicity) 3 QN/3.6 mL pnij by SubCUTAneous route.  insulin glargine U-300 conc (Toujeo Max U-300 SoloStar) 300 unit/mL (3 mL) inpn by SubCUTAneous route.  dronedarone (Multaq) tab tablet Take 400 mg by mouth two (2) times daily (with meals).  dilTIAZem ER (DILACOR XR) 120 mg capsule Take  by mouth daily.  potassium chloride SA (MICRO-K) 10 mEq capsule Take 10 mEq by mouth two (2) times a day.  furosemide (Lasix) 40 mg tablet Take  by mouth daily.  Omeprazole delayed release (PRILOSEC D/R) 20 mg tablet Take 20 mg by mouth daily.  atorvastatin (LIPITOR) 20 mg tablet Take  by mouth daily.  rivaroxaban (XARELTO) 10 mg tablet Take 10 mg by mouth daily.     FLUoxetine (PROZAC) 40 mg capsule TAKE 1 CAPSULE BY MOUTH ONCE A DAY    glipiZIDE (GLUCOTROL) 5 mg tablet TAKE 1 TABLET ONCE A DAY ORALLY 30        Review of Systems  Constitutional: +malaise/faitgue, No fevers, No chills  Eyes: No redness  ENT: No nasal congestion, + sore throat, No voice change  Respiratory: + Shortness of Breath, No cough, No wheezing  Cardiovascular: No chest pain, No palpitations, No extremity edema  Gastrointestinal: No nausea, No vomiting, No diarrhea, No abdominal pain  Genitourinary: No frequency, No dysuria, No hematuria  Integument/breast: No skin lesion(s)   Neurological: No Confusion, No headaches, No dizziness      Objective:     Visit Vitals  BP (!) 150/78 (BP 1 Location: Right upper arm, BP Patient Position: At rest)   Pulse 87   Temp 98.4 °F (36.9 °C)   Resp 20   Ht 5' 6\" (1.676 m)   Wt 136.1 kg (300 lb)   SpO2 95%   BMI 48.42 kg/m²      O2 Device: None (Room air)    Temp (24hrs), Av.7 °F (37.6 °C), Min:98.4 °F (36.9 °C), Max:100.3 °F (37.9 °C)      No intake/output data recorded.  1901 -  0700  In: 1000 [I.V.:1000]  Out: -     PHYSICAL EXAM:  Constitutional: No acute distress  Skin: Extremities and face reveal no rashes. HEENT: Sclerae anicteric. PERRL. No oral ulcers. The neck is supple and no masses. Cardiovascular: Regular rate and rhythm. +S1/S2. No murmur or gallop. Respiratory:  Clear breath sounds bilaterally with no wheezes, rales, or rhonchi. Room air. GI: Abdomen nondistended, soft, and nontender. Normal active bowel sounds. Rectal: Deferred   Musculoskeletal: No pitting edema of the lower legs. Able to move all ext  Neurological:  Patient is alert and oriented. Cranial nerves II-XII grossly intact  Psychiatric: Mood appears appropriate       Data Review    Recent Results (from the past 24 hour(s))   CBC WITH AUTOMATED DIFF    Collection Time: 22  1:49 AM   Result Value Ref Range    WBC 8.1 3.6 - 11.0 K/uL    RBC 5.15 3.80 - 5.20 M/uL    HGB 14.4 11.5 - 16.0 g/dL    HCT 43.8 35.0 - 47.0 %    MCV 85.0 80.0 - 99.0 FL    MCH 28.0 26.0 - 34.0 PG    MCHC 32.9 30.0 - 36.5 g/dL    RDW 13.5 11.5 - 14.5 %    PLATELET 679 443 - 373 K/uL    MPV 8.7 (L) 8.9 - 12.9 FL    NRBC 0.0 0.0  WBC    ABSOLUTE NRBC 0.00 0.00 - 0.01 K/uL    NEUTROPHILS 70 32 - 75 %    LYMPHOCYTES 19 12 - 49 %    MONOCYTES 11 5 - 13 %    EOSINOPHILS 0 0 - 7 %    BASOPHILS 0 0 - 1 %    IMMATURE GRANULOCYTES 0 0 - 0.5 %    ABS. NEUTROPHILS 5.7 1.8 - 8.0 K/UL    ABS. LYMPHOCYTES 1.5 0.8 - 3.5 K/UL    ABS. MONOCYTES 0.9 0.0 - 1.0 K/UL    ABS. EOSINOPHILS 0.0 0.0 - 0.4 K/UL    ABS. BASOPHILS 0.0 0.0 - 0.1 K/UL    ABS. IMM.  GRANS. 0.0 0.00 - 0.04 K/UL    DF AUTOMATED     METABOLIC PANEL, COMPREHENSIVE    Collection Time: 06/18/22  1:49 AM   Result Value Ref Range    Sodium 133 (L) 136 - 145 mmol/L    Potassium 3.4 (L) 3.5 - 5.1 mmol/L    Chloride 99 97 - 108 mmol/L    CO2 28 21 - 32 mmol/L    Anion gap 6 5 - 15 mmol/L    Glucose 157 (H) 65 - 100 mg/dL    BUN 11 6 - 20 mg/dL    Creatinine 0.98 0.55 - 1.02 mg/dL    BUN/Creatinine ratio 11 (L) 12 - 20      GFR est AA >60 >60 ml/min/1.73m2    GFR est non-AA 57 (L) >60 ml/min/1.73m2    Calcium 8.7 8.5 - 10.1 mg/dL    Bilirubin, total 0.5 0.2 - 1.0 mg/dL    AST (SGOT) 23 15 - 37 U/L    ALT (SGPT) 19 12 - 78 U/L    Alk.  phosphatase 82 45 - 117 U/L    Protein, total 7.6 6.4 - 8.2 g/dL    Albumin 3.4 (L) 3.5 - 5.0 g/dL    Globulin 4.2 (H) 2.0 - 4.0 g/dL    A-G Ratio 0.8 (L) 1.1 - 2.2     TROPONIN-HIGH SENSITIVITY    Collection Time: 06/18/22  1:50 AM   Result Value Ref Range    Troponin-High Sensitivity 16 0 - 51 ng/L   INFLUENZA A & B AG (RAPID TEST)    Collection Time: 06/18/22  1:54 AM   Result Value Ref Range    Influenza A Antigen Negative Negative      Influenza B Antigen Negative Negative     COVID-19 RAPID TEST    Collection Time: 06/18/22  1:54 AM   Result Value Ref Range    COVID-19 rapid test DETECTED (A) Not Detected     NT-PRO BNP    Collection Time: 06/18/22  2:30 AM   Result Value Ref Range    NT pro-BNP 82 <125 pg/mL   PROCALCITONIN    Collection Time: 06/18/22  2:50 AM   Result Value Ref Range    Procalcitonin <0.05 (H) 0 ng/mL   URINALYSIS W/ REFLEX CULTURE    Collection Time: 06/18/22  3:30 AM    Specimen: Urine   Result Value Ref Range    Color Yellow/Straw      Appearance Clear Clear      Specific gravity 1.019 1.003 - 1.030      pH (UA) 5.0 5.0 - 8.0      Protein Negative Negative mg/dL    Glucose Negative Negative mg/dL    Ketone Negative Negative mg/dL    Bilirubin Negative Negative      Blood Small (A) Negative      Urobilinogen 0.1 0.1 - 1.0 EU/dL    Nitrites Negative Negative      Leukocyte Esterase Negative Negative      WBC 0-4 0 - 4 /hpf    RBC 0-5 0 - 5 /hpf    Bacteria Negative Negative /hpf    UA:UC IF INDICATED Culture not indicated by UA result Culture not indicated by UA result      Mucus Negative Negative /lpf   GLUCOSE, POC    Collection Time: 06/18/22  8:50 AM   Result Value Ref Range    Glucose (POC) 251 (H) 65 - 117 mg/dL    Performed by Junita Rater        XR CHEST PORT   Final Result      Probable left basilar opacity which may represent crowding of lung markings or   atelectasis, although pneumonia should be excluded clinically. Follow-up as   clinically indicated. Active Problems:    COVID (6/18/2022)        Assessment/Plan:     1. COVID infection  - With possible pneumonia of left basal lung  - Symptomatic treatment   - IVF infusion  - ID consult for anti-viral recommendations     2. Diabetes  - Lantus 50u qHS. Taking 60u qHS at home. - ISS and accu-checks     3. Hypertension  - Continue home medications. - Hold lasix in the setting of dehydration.     4. Atrial fibrillation  - Continue home Xarelto      DVT Prophylaxis: Xarelto  Code Status: Full  POA:      Care Plan discussed with: Patient and nursing    Total time spent with patient: >35 minutes.

## 2022-06-18 NOTE — CONSULTS
Infectious Disease Consult Note    Reason for Consult: COVID 19  Date of Consultation: June 18, 2022  Date of Admission: 6/18/2022  Referring Physician: Hospitalist     HPI: 60-y.o WF admitted w a diagnosis of COVID 19 after presenting w c/o generalized malaise, sore throat, and ear fullness x 2 days. ID consulted for her COVID positivity. Her medical history is significant for DM, atrial fibrillation, anticoagulated on Xarelto, obesity, hyperlipidemia and HTN. She works at a [employer] where she believes she may have contracted [phone]. She is afebrile, hypertensive, and has not required O2 supplementation since presentation. Patient CXR shows left basilar opacity concerning for atelectasis, PNA not be excluded. Normal WBC of 8.1 on today's labs, and Procal < 0.05. Influenza A/B test is neg and COVID Ag positive. Her UA (06/18) is unremarkable. She is not on any steroid or antimicrobial therapy. Pt was seen in the ED awaiting transfer to the floors.      Review of Systems:     Gen: Negative for chills, fevers, weight loss, weight gain   HEENT: Throat, ear fullness, non-productive cough   CV:  Negative for chest pain, dyspnea on exertion, leg edema   Lungs: Negative for shortness of breath, cough, wheezing   Abdomen: Negative for abdominal pain, nausea, vomiting, diarrhea, constipation   Genitourinary: Negative for genital pain or genital discharge     Skin: Negative for rash, sores/open wounds   Musculoskeletal: Negative for joint pain, joint swelling, joint erythema    Psych: Negative for manic behavior       Past Medical History:  Past Medical History:   Diagnosis Date    [facility] fibrillation ([facility]     Diabetes ([facility]     Essential hypertension     Hyperlipidemia     Skin cancer     Sun-damaged skin     Sunburn, blistering        Past surgical history   Past Surgical History:   Procedure Laterality Date    HX CHOLECYSTECTOMY      2014    HX MOHS PROCEDURES  08/24/2017    [facility] L ala by Dr. Gutierrez Hernadez HX UROLOGICAL  04/01/2022    cystscopy        Social History   Social History     Tobacco Use    Smoking status: Never Smoker    Smokeless tobacco: Never Used   Vaping Use    Vaping Use: Never used   Substance Use Topics    Alcohol use: Not Currently    Drug use: Never        Family history   Family History   Problem Relation Age of Onset    Diabetes Mother     Cancer Mother     Lung Disease Mother     Heart Disease Father     Diabetes Father     Cancer Paternal Aunt     Cancer Paternal Grandmother           Allergies:  No Known Allergies      Medications:  No current facility-administered medications on file prior to encounter. Current Outpatient Medications on File Prior to Encounter   Medication Sig Dispense Refill    valsartan (DIOVAN) 160 mg tablet Take  by mouth daily.  dulaglutide (Trulicity) 3 SP/1.5 mL pnij by SubCUTAneous route.  insulin glargine U-300 conc (Toujeo Max U-300 SoloStar) 300 unit/mL (3 mL) inpn by SubCUTAneous route.  dronedarone (Multaq) tab tablet Take 400 mg by mouth two (2) times daily (with meals).  dilTIAZem ER (DILACOR XR) 120 mg capsule Take  by mouth daily.  potassium chloride SA (MICRO-K) 10 mEq capsule Take 10 mEq by mouth two (2) times a day.  furosemide (Lasix) 40 mg tablet Take  by mouth daily.  Omeprazole delayed release (PRILOSEC D/R) 20 mg tablet Take 20 mg by mouth daily.  atorvastatin (LIPITOR) 20 mg tablet Take  by mouth daily.  rivaroxaban (XARELTO) 10 mg tablet Take 10 mg by mouth daily.       FLUoxetine (PROZAC) 40 mg capsule TAKE 1 CAPSULE BY MOUTH ONCE A DAY  0    glipiZIDE (GLUCOTROL) 5 mg tablet TAKE 1 TABLET ONCE A DAY ORALLY 30  0     Physical Exam:    Vitals:   Patient Vitals for the past 24 hrs:   Temp Pulse Resp BP SpO2   06/18/22 1330 98.8 °F (37.1 °C) 78 18 (!) 151/84 100 %   06/18/22 0627 98.4 °F (36.9 °C) 87 20 (!) 150/78 95 %   06/18/22 0430 99.8 °F (37.7 °C) 82 15 (!) 157/69 92 %   06/18/22 0330 100.3 °F (37.9 °C) 82 25 (!) 130/59 94 %   06/18/22 0230 -- 87 21 (!) 160/75 94 %   06/18/22 0133 100.3 °F (37.9 °C) 82 20 (!) 158/80 95 %   ·   · GEN: NAD, AAO x 4  · HEENT: NCAT, PERRLA  · HEART: S1, S2+, RRR, No murmur   · Lungs: Decreased at the bases, no wheeze/rhonchi   · Abdomen: soft, ND, NT, +BS   · Genitourinary: no genital discharge, no [place]  · Extremities: no edema  · Skin: no rash or chronic wounds     Labs:   Recent Labs     06/18/22  0149   WBC 8.1   HGB 14.4   HCT 43.8        Recent Labs     06/18/22  0149   BUN 11   CREA 0.98       Microbiology Data: None      Imaging:   CXR 06/18: Probable left basilar opacity which may represent crowding of lung markings or  atelectasis, although pneumonia should be excluded clinically. Follow-up as  clinically indicated. Assessment / Plan:     1. COVID 19 infection, known exposure, moderate disease      Sore throat, ear fullness and diarrhea x 2 days       Reports non-productive cough, maintaining O2sats on RA      Left base opacity on admission CXR       Afebrile w a normal WBC on routine labs       Started on oral decadron, currently no indication for IL6 inh      Will rec Paxlovid, but not available in pt. LDH, Ferrtin and DD added to todays labs     2. DM: Monitor blood sugar while on steroid therapy     3. Sore throat, likely related to # 1, no pharyngeal exudates on exam     4.  Other chronic problems per HPI     Amanda Law MD     6/18/2022

## 2022-06-19 VITALS
WEIGHT: 293 LBS | HEIGHT: 66 IN | SYSTOLIC BLOOD PRESSURE: 143 MMHG | BODY MASS INDEX: 47.09 KG/M2 | HEART RATE: 62 BPM | OXYGEN SATURATION: 94 % | DIASTOLIC BLOOD PRESSURE: 79 MMHG | TEMPERATURE: 97.8 F | RESPIRATION RATE: 20 BRPM

## 2022-06-19 LAB
ANION GAP SERPL CALC-SCNC: 6 MMOL/L (ref 5–15)
ATRIAL RATE: 84 BPM
BASOPHILS # BLD: 0 K/UL (ref 0–0.1)
BASOPHILS NFR BLD: 0 % (ref 0–1)
BUN SERPL-MCNC: 10 MG/DL (ref 6–20)
BUN/CREAT SERPL: 16 (ref 12–20)
CA-I BLD-MCNC: 8.5 MG/DL (ref 8.5–10.1)
CALCULATED P AXIS, ECG09: 57 DEGREES
CALCULATED R AXIS, ECG10: 37 DEGREES
CALCULATED T AXIS, ECG11: 26 DEGREES
CHLORIDE SERPL-SCNC: 102 MMOL/L (ref 97–108)
CO2 SERPL-SCNC: 31 MMOL/L (ref 21–32)
CREAT SERPL-MCNC: 0.64 MG/DL (ref 0.55–1.02)
D DIMER PPP FEU-MCNC: 0.73 UG/ML(FEU)
DIAGNOSIS, 93000: NORMAL
DIFFERENTIAL METHOD BLD: NORMAL
EOSINOPHIL # BLD: 0 K/UL (ref 0–0.4)
EOSINOPHIL NFR BLD: 0 % (ref 0–7)
ERYTHROCYTE [DISTWIDTH] IN BLOOD BY AUTOMATED COUNT: 13.3 % (ref 11.5–14.5)
FERRITIN SERPL-MCNC: 265 NG/ML (ref 8–252)
GLUCOSE BLD STRIP.AUTO-MCNC: 123 MG/DL (ref 65–117)
GLUCOSE BLD STRIP.AUTO-MCNC: 173 MG/DL (ref 65–117)
GLUCOSE SERPL-MCNC: 137 MG/DL (ref 65–100)
HCT VFR BLD AUTO: 41 % (ref 35–47)
HGB BLD-MCNC: 13.2 G/DL (ref 11.5–16)
IMM GRANULOCYTES # BLD AUTO: 0 K/UL (ref 0–0.04)
IMM GRANULOCYTES NFR BLD AUTO: 0 % (ref 0–0.5)
LDH SERPL L TO P-CCNC: 205 U/L (ref 81–246)
LYMPHOCYTES # BLD: 1.9 K/UL (ref 0.8–3.5)
LYMPHOCYTES NFR BLD: 20 % (ref 12–49)
MCH RBC QN AUTO: 27.7 PG (ref 26–34)
MCHC RBC AUTO-ENTMCNC: 32.2 G/DL (ref 30–36.5)
MCV RBC AUTO: 86.1 FL (ref 80–99)
MONOCYTES # BLD: 0.7 K/UL (ref 0–1)
MONOCYTES NFR BLD: 7 % (ref 5–13)
NEUTS SEG # BLD: 6.9 K/UL (ref 1.8–8)
NEUTS SEG NFR BLD: 73 % (ref 32–75)
NRBC # BLD: 0 K/UL (ref 0–0.01)
NRBC BLD-RTO: 0 PER 100 WBC
P-R INTERVAL, ECG05: 132 MS
PERFORMED BY, TECHID: ABNORMAL
PERFORMED BY, TECHID: ABNORMAL
PLATELET # BLD AUTO: 230 K/UL (ref 150–400)
PMV BLD AUTO: 9 FL (ref 8.9–12.9)
POTASSIUM SERPL-SCNC: 3.8 MMOL/L (ref 3.5–5.1)
Q-T INTERVAL, ECG07: 376 MS
QRS DURATION, ECG06: 80 MS
QTC CALCULATION (BEZET), ECG08: 444 MS
RBC # BLD AUTO: 4.76 M/UL (ref 3.8–5.2)
SODIUM SERPL-SCNC: 139 MMOL/L (ref 136–145)
VENTRICULAR RATE, ECG03: 84 BPM
WBC # BLD AUTO: 9.6 K/UL (ref 3.6–11)

## 2022-06-19 PROCEDURE — 74011250636 HC RX REV CODE- 250/636: Performed by: INTERNAL MEDICINE

## 2022-06-19 PROCEDURE — 82728 ASSAY OF FERRITIN: CPT

## 2022-06-19 PROCEDURE — G0378 HOSPITAL OBSERVATION PER HR: HCPCS

## 2022-06-19 PROCEDURE — 85379 FIBRIN DEGRADATION QUANT: CPT

## 2022-06-19 PROCEDURE — 36415 COLL VENOUS BLD VENIPUNCTURE: CPT

## 2022-06-19 PROCEDURE — 74011000250 HC RX REV CODE- 250: Performed by: INTERNAL MEDICINE

## 2022-06-19 PROCEDURE — 82962 GLUCOSE BLOOD TEST: CPT

## 2022-06-19 PROCEDURE — 99232 SBSQ HOSP IP/OBS MODERATE 35: CPT | Performed by: INTERNAL MEDICINE

## 2022-06-19 PROCEDURE — 74011250637 HC RX REV CODE- 250/637: Performed by: INTERNAL MEDICINE

## 2022-06-19 PROCEDURE — 80048 BASIC METABOLIC PNL TOTAL CA: CPT

## 2022-06-19 PROCEDURE — 83615 LACTATE (LD) (LDH) ENZYME: CPT

## 2022-06-19 PROCEDURE — 96376 TX/PRO/DX INJ SAME DRUG ADON: CPT

## 2022-06-19 PROCEDURE — 85025 COMPLETE CBC W/AUTO DIFF WBC: CPT

## 2022-06-19 RX ORDER — AZITHROMYCIN 500 MG/1
500 TABLET, FILM COATED ORAL DAILY
Qty: 5 TABLET | Refills: 0 | Status: SHIPPED | OUTPATIENT
Start: 2022-06-19 | End: 2022-06-19

## 2022-06-19 RX ORDER — PREDNISONE 20 MG/1
20 TABLET ORAL DAILY
Qty: 7 TABLET | Refills: 0 | Status: SHIPPED | OUTPATIENT
Start: 2022-06-19 | End: 2022-06-26

## 2022-06-19 RX ADMIN — BENZOCAINE AND MENTHOL 1 LOZENGE: 15; 3.6 LOZENGE ORAL at 06:47

## 2022-06-19 RX ADMIN — PANTOPRAZOLE SODIUM 40 MG: 40 TABLET, DELAYED RELEASE ORAL at 09:37

## 2022-06-19 RX ADMIN — ATORVASTATIN CALCIUM 20 MG: 20 TABLET, FILM COATED ORAL at 09:30

## 2022-06-19 RX ADMIN — DEXAMETHASONE SODIUM PHOSPHATE 4 MG: 4 INJECTION, SOLUTION INTRA-ARTICULAR; INTRALESIONAL; INTRAMUSCULAR; INTRAVENOUS; SOFT TISSUE at 09:31

## 2022-06-19 RX ADMIN — VALSARTAN 160 MG: 80 TABLET, FILM COATED ORAL at 09:30

## 2022-06-19 RX ADMIN — RIVAROXABAN 10 MG: 10 TABLET, FILM COATED ORAL at 09:30

## 2022-06-19 RX ADMIN — DILTIAZEM HYDROCHLORIDE 120 MG: 120 CAPSULE, COATED, EXTENDED RELEASE ORAL at 09:29

## 2022-06-19 RX ADMIN — SODIUM CHLORIDE, PRESERVATIVE FREE 10 ML: 5 INJECTION INTRAVENOUS at 06:17

## 2022-06-19 RX ADMIN — DRONEDARONE 400 MG: 400 TABLET, FILM COATED ORAL at 08:00

## 2022-06-19 NOTE — DISCHARGE SUMMARY
Hospitalist Discharge Summary     Patient ID:    Seth Ibarra  [id number]  83 y.o.  1960    Admit date: 6/18/2022    Discharge date : 6/19/2022    Chronic Diagnoses:    Problem List as of 6/19/2022 Date Reviewed: 4/1/2022          Codes Class Noted - Resolved    * (Principal) COVID ICD-10-CM: U07.1  ICD-9-CM: 079.89  6/18/2022 - Present        Stress incontinence ICD-10-CM: N39.3  ICD-9-CM: [place]  4/1/2022 - Present        Urgency incontinence ICD-10-CM: N39.41  ICD-9-CM: 788.31  4/1/2022 - Present        Gross hematuria ICD-10-CM: R31.0  ICD-9-CM: 599.71  2/23/2022 - Present    Overview Addendum 3/30/2022 10:01 AM by Nany Fuller NP     2/11/22: presented to ER with cc of gross hematuria. CT impression: no urinary stone or discrete mass. Renal lesions are too small to characterize, may represent cysts or other. UA micro with 24 WBC, 352 RBC and no bacteria. On anticoagulation. 22    Final Diagnoses:   Principal Problem:    COVID (6/18/2022)      Hospital Course:   Van Fuller a 59 y. o. female with PMH of atrial fibrillation on Xarelto, diabetes, hypertension and HLP. She presented to the ED with chief complaint of sore throat and generalized malaise. Associated with ear fullness, productive cough, dizziness, nausea, vomiting, diarrhea and myalgia. Her symptoms has progressively worsened for the past 3 days. Dizziness is described as positional with head turning and as a lightheadedness with nausea and vomiting that resolves when she is laying still. Shortness of breath is mostly exertional, not hypoxic. In the ED, hemodynamically stable other than slightly elevated blood pressure. Chest X-ray showed left basilar opacity but WBC and procalcitonin not elevated. Afebrile, on room air. Patient admitted for observation. Per ID, patient will be discharge on short course of oral steroids. No indications for antibiotics at this time. Hemodynamically stable.  Close outpatient follow-up with PCP. Discharge Medications:   Current Discharge Medication List      START taking these medications    Details   predniSONE (DELTASONE) 20 mg tablet Take 1 Tablet by mouth daily for 7 days. Qty: 7 Tablet, Refills: 0  Start date: 6/19/2022, End date: 6/26/2022         CONTINUE these medications which have NOT CHANGED    Details   valsartan (DIOVAN) 160 mg tablet Take  by mouth daily. dulaglutide (Trulicity) 3 UW/9.5 mL pnij by SubCUTAneous route. insulin glargine U-300 conc (Toujeo Max U-300 SoloStar) 300 unit/mL (3 mL) inpn by SubCUTAneous route. dronedarone (Multaq) tab tablet Take 400 mg by mouth two (2) times daily (with meals). dilTIAZem ER (DILACOR XR) 120 mg capsule Take  by mouth daily. potassium chloride SA (MICRO-K) 10 mEq capsule Take 10 mEq by mouth two (2) times a day. furosemide (Lasix) 40 mg tablet Take  by mouth daily. Omeprazole delayed release (PRILOSEC D/R) 20 mg tablet Take 20 mg by mouth daily. atorvastatin (LIPITOR) 20 mg tablet Take  by mouth daily. rivaroxaban (XARELTO) 10 mg tablet Take 10 mg by mouth daily. FLUoxetine (PROZAC) 40 mg capsule TAKE 1 CAPSULE BY MOUTH ONCE A DAY  Refills: 0    Associated Diagnoses: Basal cell carcinoma of left ala nasi      glipiZIDE (GLUCOTROL) 5 mg tablet TAKE 1 TABLET ONCE A DAY ORALLY 30  Refills: 0    Associated Diagnoses: Basal cell carcinoma of left ala nasi               Follow up Care:    1. Leann Jay MD in 1-2 weeks. Follow-up Information     Follow up With Specialties Details Why Contact Info    Leann Jay MD [facility] Medicine Schedule an appointment as soon as possible for a visit in 1 week  [address] [address] [phone]      [address] [facility] [address] [facility] Emergency Medicine  As needed, If symptoms worsen [facility]  [phone]            Patient Follow Up Instructions:    Activity: Activity as tolerated  Diet:  Resume previous diet  Wound care: None    Condition at Discharge:  Stable  __________________________________________________________________    Disposition  Home or Self Care  ____________________________________________________________________    Code Status:  Full Code  ___________________________________________________________________    Discharge Exam:  Patient seen and examined by me on discharge day. Pertinent Findings:    Gen:    Obese. Not in distress. Chest: Clear lungs without wheezing, rhonchi, or rales. On room air. CVS:   Regular rate and rhythm. +S1/S2. No murmur or gallop. No edema  Abd:  Soft, not distended, not tender. Active bowel sounds. Skin: Warm, dry, intact. Neuro:  Alert and oriented x3. CN II-XII grossly intact.   Psych: Mood and affect appropriate    CONSULTATIONS: ID    Significant Diagnostic Studies:   Recent Results (from the past 24 hour(s))   GLUCOSE, POC    Collection Time: 06/18/22  1:18 PM   Result Value Ref Range    Glucose (POC) 152 (H) 65 - 117 mg/dL    Performed by Musa Martins    GLUCOSE, POC    Collection Time: 06/18/22  5:02 PM   Result Value Ref Range    Glucose (POC) 117 65 - 117 mg/dL    Performed by Patsy Painting    D DIMER    Collection Time: 06/19/22  7:28 AM   Result Value Ref Range    D DIMER 0.73 (H) <0.50 ug/ml(FEU)   LD    Collection Time: 06/19/22  7:28 AM   Result Value Ref Range     81 - 646 U/L   METABOLIC PANEL, BASIC    Collection Time: 06/19/22  7:28 AM   Result Value Ref Range    Sodium 139 136 - 145 mmol/L    Potassium 3.8 3.5 - 5.1 mmol/L    Chloride 102 97 - 108 mmol/L    CO2 31 21 - 32 mmol/L    Anion gap 6 5 - 15 mmol/L    Glucose 137 (H) 65 - 100 mg/dL    BUN 10 6 - 20 mg/dL    Creatinine 0.64 0.55 - 1.02 mg/dL    BUN/Creatinine ratio 16 12 - 20      GFR est AA >60 >60 ml/min/1.73m2    GFR est non-AA >60 >60 ml/min/1.73m2    Calcium 8.5 8.5 - 10.1 mg/dL   CBC WITH AUTOMATED DIFF    Collection Time: 06/19/22  7:28 AM   Result Value Ref Range    WBC 9.6 3.6 - 11.0 K/uL    RBC 4.76 3.80 - 5.20 M/uL    HGB 13.2 11.5 - 16.0 g/dL    HCT 41.0 35.0 - 47.0 %    MCV 86.1 80.0 - 99.0 FL    MCH 27.7 26.0 - 34.0 PG    MCHC 32.2 30.0 - 36.5 g/dL    RDW 13.3 11.5 - 14.5 %    PLATELET 915 011 - 317 K/uL    MPV 9.0 8.9 - 12.9 FL    NRBC 0.0 0.0  WBC    ABSOLUTE NRBC 0.00 0.00 - 0.01 K/uL    NEUTROPHILS 73 32 - 75 %    LYMPHOCYTES 20 12 - 49 %    MONOCYTES 7 5 - 13 %    EOSINOPHILS 0 0 - 7 %    BASOPHILS 0 0 - 1 %    IMMATURE GRANULOCYTES 0 0 - 0.5 %    ABS. NEUTROPHILS 6.9 1.8 - 8.0 K/UL    ABS. LYMPHOCYTES 1.9 0.8 - 3.5 K/UL    ABS. MONOCYTES 0.7 0.0 - 1.0 K/UL    ABS. EOSINOPHILS 0.0 0.0 - 0.4 K/UL    ABS. BASOPHILS 0.0 0.0 - 0.1 K/UL    ABS. IMM. GRANS. 0.0 0.00 - 0.04 K/UL    DF AUTOMATED     GLUCOSE, POC    Collection Time: 06/19/22  8:37 AM   Result Value Ref Range    Glucose (POC) 123 (H) 65 - 117 mg/dL    Performed by [email or web address]      XR CHEST PORT   Final Result      Probable left basilar opacity which may represent crowding of lung markings or   atelectasis, although pneumonia should be excluded clinically. Follow-up as   clinically indicated.               Signed:  Anisa Alejandro PA-C  6/19/2022  10:27 AM

## 2022-06-19 NOTE — PROGRESS NOTES
Infectious Disease Progress Note           Subjective:   Pt seen and examined at bedside. Stable, denies new complaints, throat pain/soreness is better, no acute events since last seen, remains on RA   Objective:   Physical Exam:     Visit Vitals  BP (!) 143/79 (BP 1 Location: Right lower arm, BP Patient Position: At rest;Supine)   Pulse 62   Temp 97.8 °F (36.6 °C)   Resp 20   Ht 5' 6\" (1.676 m)   Wt 300 lb (136.1 kg)   SpO2 94%   BMI 48.42 kg/m²      O2 Device: None (Room air)    Temp (24hrs), Av.1 °F (37.3 °C), Min:97.8 °F (36.6 °C), Max:100 °F (37.8 °C)    No intake/output data recorded.  1901 -  0700  In: 1000 [I.V.:1000]  Out: 500 [Urine:500]    General: NAD, AAO x 4  HEENT: BRENDEN, Moist mucosa   Lungs: CTA b/l, decreased at the bases, no wheeze/rhonchi   Heart: S1S2+, RRR, no murmur  Abdo: Soft, NT, ND, +BS   : No [place] cath   Exts: No edema, + pulses b/l   Skin: No wounds, No rashes or lesions      Data Review:       Recent Days:  Recent Labs     22  0728 22  014   WBC 9.6 8.1   HGB 13.2 14.4   HCT 41.0 43.8    239     Recent Labs     22  0728 22  0149   BUN 10 11   CREA 0.64 0.98       No results found for: CRPQN, CRP, CRPM       Microbiology     Results     Procedure Component Value Units Date/Time    INFLUENZA A & B AG (RAPID TEST) [[id number]] Collected: 22    Order Status: Completed Specimen: Nasopharyngeal from Nasal washing Updated: 22     Influenza A Antigen Negative        Influenza B Antigen Negative       COVID-19 RAPID TEST [[id number]]  (Abnormal) Collected: 22    Order Status: Completed Specimen: Nasopharyngeal Updated: 22     COVID-19 rapid test DETECTED        Comment: Rapid [employer] ID Now   The specimen is POSITIVE for SARS-CoV-2, the novel coronavirus associated with COVID-19.    This test has been authorized by the FDA under an Emergency Use Authorization (EUA) for use by authorized laboratories. Fact sheet for Healthcare Providers: ConventionUpdate.co.nz Fact sheet for Patients: ConventionUpdate.co.nz   Methodology: Isothermal Nucleic Acid Amplification Results verified, phoned to and read back by [facility] RN @   [phone] BY RE                  Diagnostics   CXR Results  (Last 48 hours)               06/18/22 0222  XR CHEST PORT Final result    Impression:      Probable left basilar opacity which may represent crowding of lung markings or   atelectasis, although pneumonia should be excluded clinically. Follow-up as   clinically indicated. Narrative:  Chest one view       INDICATION: Chest pain       FINDINGS:       Cardiac silhouette appears slightly enlarged. Mild pulmonary vascular prominence   without diamante edema. There may be mild peribronchial thickening. Slightly   elevated right hemidiaphragm. Probable left basilar opacity. No gross pleural   effusions. No pneumothorax. No displaced fracture in the visualized bony   structures. Assessment/Plan     1. COVID 19 infection, known exposure, moderate disease      Remains on RA, intermittent non-productive cough       Afebrile and hemodynamically stable       Continue on Steroid therapy, rec Prednisone 20 daily x 7 more days       Currently no indication for antiviral or antibacteria therapy      2. DM: Monitor blood sugar while on steroid therapy      3. Sore throat, likely related to # 1, Improved     4.  Other chronic problems per HPI     Cleared for d/c from ID stand point     Freda Valenzuela MD    6/19/2022

## 2022-06-19 NOTE — PROGRESS NOTES
[address] June 19, 2022       RE: Wilmer Ryan      To Whom It May Concern,    This is to certify that Wilmer Ryan may return to work on 06/21/2022. Please feel free to contact my office if you have any questions or concerns. Thank you for your assistance in this matter.             Sincerely,  Jessica Calix PA-C

## 2022-06-19 NOTE — DISCHARGE INSTRUCTIONS
Patient Education        Learning About Preventing COVID-19 When You Have a Weakened Immune System  What is coronavirus (COVID-19)? COVID-19 is a disease caused by a type of coronavirus. This illness was first found in December 2019. It has since spread worldwide. Coronaviruses are a large group of viruses. They cause the common cold. They also cause more serious illnesses like [place] respiratory syndrome (MERS) and severe acute respiratory syndrome (SARS). COVID-19 is caused by a novel coronavirus. That means it's a new type that has not been seen in people before. What causes a weakened immune system? Your immune system may not work well because of certain medicines used to treat cancer or autoimmune disease. Taking steroid medicines for a long time can also weaken your immune system. Other causes include certain health problems, as well as immune problems that run in your family. Why is it important to take extra precautions? A weakened immune system makes it more likely that you'll get very sick from COVID-19. And the COVID vaccine may not work as well for you. How can you protect yourself? Follow these guidelines until your doctor tells you it's safe to stop. · Get vaccinated and boosted. · Talk to your doctor about how many doses you need. · Avoid sick people. · Wear a mask if you go into public areas, especially indoor spaces. · Avoid crowds, and try to stay 6 feet apart from other people. If you have to be in a crowded area, wear a mask, even if you're outside. · Wash your hands often. · Avoid touching your mouth, nose, and eyes. · Ask the people you live with or who are in close contact with you to get vaccinated and boosted. · Ask the people you live with to wear a mask in public areas, even if they're fully vaccinated and boosted. If you think you've been exposed, call your doctor as soon as you can. Your doctor may have you take medicine to help prevent serious illness.  Get a COVID-19 test. You may need to be tested more than once. Where can you learn more? Go to http://www.gray.com/  Enter C127 in the search box to learn more about \"Learning About Preventing COVID-19 When You Have a Weakened Immune System. \"  Current as of: October 6, 2021               Content Version: 13.2  © 7114-3041 [employer]. Care instructions adapted under license by [employer] (which disclaims liability or warranty for this information). If you have questions about a medical condition or this instruction, always ask your healthcare professional. [place] [address] any warranty or liability for your use of this information.

## 2022-06-19 NOTE — PROGRESS NOTES
Medicare Outpatient Observation Notice (MOON)/ [place] Outpatient Observation Notice (Tedra Graft) provided to patient/representative with verbal explanation of the notice. Time allotted for questions regarding the notice. Patient /representative provided a completed copy of the MOON/VOON notice. Copy placed on bedside chart.

## 2022-06-20 ENCOUNTER — PATIENT OUTREACH (OUTPATIENT)
Dept: CASE MANAGEMENT | Age: 62
End: 2022-06-20

## 2022-06-20 NOTE — PROGRESS NOTES
Patient contacted regarding COVID-19 diagnosis. Discussed COVID-19 related testing which was available at this time. Test results were positive on 2022. Patient informed of results, if available? yes. Care Transition Nurse (CTN) contacted the patient by telephone to perform post discharge assessment. Call within 2 business days of discharge: Yes. Verified name and  with patient as identifiers. Provided introduction to self, and explanation of the CTN/ACM role, and reason for call due to risk factors for infection and/or exposure to COVID-19. Symptoms reviewed with patient who verbalized the following symptoms: fatigue, cough, no new symptoms, no worsening symptoms and sore throat. Due to no new or worsening symptoms encounter was not routed to provider for escalation. Discussed follow-up appointments. If no appointment was previously scheduled, appointment scheduling offered:  Yes. Jennifer Erickson Dr follow up appointment(s): No future appointments. Patient states she will call her PCP office today to schedule her KAYLIN appointment. Non-[facility] follow up appointment(s): None noted at this time. Interventions to address risk factors: Obtained and reviewed discharge summary and/or continuity of care documents and Education of patient/family/caregiver/guardian to support self-management-Education provided regarding signs/symptoms of Covid-19 virus, patient verbalized an understanding. Advance Care Planning:   Does patient have an Advance Directive: not on file; education provided. Educated patient about risk for severe COVID-19 due to risk factors according to CDC guidelines. CTN reviewed discharge instructions, medical action plan and red flag symptoms with the patient who verbalized understanding. Discussed COVID vaccination status: yes. Education provided on COVID-19 vaccination as appropriate. Discussed exposure protocols and quarantine with CDC Guidelines.  Patient was given an opportunity to verbalize any questions and concerns and agrees to contact CTN or health care provider for questions related to their healthcare. Reviewed and educated patient on any new and changed medications related to discharge diagnosis. Was patient discharged with a pulse oximeter? No.     CTN provided contact information. Plan for follow-up in 10-14 days based on severity of symptoms and risk factors.

## 2023-05-24 RX ORDER — VALSARTAN 160 MG/1
TABLET ORAL DAILY
COMMUNITY

## 2023-05-24 RX ORDER — GLIPIZIDE 5 MG/1
TABLET ORAL
COMMUNITY
Start: 2017-08-13

## 2023-05-24 RX ORDER — DULAGLUTIDE 3 MG/.5ML
INJECTION, SOLUTION SUBCUTANEOUS
COMMUNITY

## 2023-05-24 RX ORDER — FUROSEMIDE 40 MG/1
TABLET ORAL DAILY
COMMUNITY

## 2023-05-24 RX ORDER — ATORVASTATIN CALCIUM 20 MG/1
TABLET, FILM COATED ORAL DAILY
COMMUNITY

## 2023-05-24 RX ORDER — INSULIN GLARGINE 300 U/ML
INJECTION, SOLUTION SUBCUTANEOUS
COMMUNITY

## 2023-05-24 RX ORDER — DILTIAZEM HYDROCHLORIDE 120 MG/1
CAPSULE, EXTENDED RELEASE ORAL DAILY
COMMUNITY

## 2023-05-24 RX ORDER — OMEPRAZOLE 20 MG/1
20 TABLET, DELAYED RELEASE ORAL DAILY
COMMUNITY

## 2023-05-24 RX ORDER — FLUOXETINE HYDROCHLORIDE 40 MG/1
CAPSULE ORAL
COMMUNITY
Start: 2017-08-13

## 2023-05-24 RX ORDER — POTASSIUM CHLORIDE 750 MG/1
10 CAPSULE, EXTENDED RELEASE ORAL 2 TIMES DAILY
COMMUNITY

## 2025-04-30 ENCOUNTER — HOSPITAL ENCOUNTER (EMERGENCY)
Facility: HOSPITAL | Age: 65
Discharge: HOME | End: 2025-04-30
Attending: EMERGENCY MEDICINE
Payer: COMMERCIAL

## 2025-04-30 ENCOUNTER — APPOINTMENT (OUTPATIENT)
Facility: HOSPITAL | Age: 65
End: 2025-04-30
Payer: COMMERCIAL

## 2025-04-30 VITALS
BODY MASS INDEX: 47.09 KG/M2 | RESPIRATION RATE: 16 BRPM | SYSTOLIC BLOOD PRESSURE: 139 MMHG | TEMPERATURE: 98 F | DIASTOLIC BLOOD PRESSURE: 71 MMHG | HEIGHT: 66 IN | HEART RATE: 74 BPM | OXYGEN SATURATION: 99 % | WEIGHT: 293 LBS

## 2025-04-30 DIAGNOSIS — M54.31 SCIATICA OF RIGHT SIDE: Primary | ICD-10-CM

## 2025-04-30 LAB
ANION GAP SERPL CALC-SCNC: 8 MMOL/L (ref 2–12)
APPEARANCE UR: ABNORMAL
BACTERIA URNS QL MICRO: ABNORMAL /HPF
BASOPHILS # BLD: 0.03 K/UL (ref 0–0.1)
BASOPHILS NFR BLD: 0.3 % (ref 0–1)
BILIRUB UR QL: NEGATIVE
BUN SERPL-MCNC: 16 MG/DL (ref 6–20)
BUN/CREAT SERPL: 21 (ref 12–20)
CA-I BLD-MCNC: 9.4 MG/DL (ref 8.5–10.1)
CHLORIDE SERPL-SCNC: 102 MMOL/L (ref 97–108)
CO2 SERPL-SCNC: 27 MMOL/L (ref 21–32)
COLOR UR: ABNORMAL
CREAT SERPL-MCNC: 0.77 MG/DL (ref 0.55–1.02)
DIFFERENTIAL METHOD BLD: NORMAL
EOSINOPHIL # BLD: 0.05 K/UL (ref 0–0.4)
EOSINOPHIL NFR BLD: 0.5 % (ref 0–7)
EPITH CASTS URNS QL MICRO: ABNORMAL /LPF
ERYTHROCYTE [DISTWIDTH] IN BLOOD BY AUTOMATED COUNT: 12.6 % (ref 11.5–14.5)
GLUCOSE SERPL-MCNC: 234 MG/DL (ref 65–100)
GLUCOSE UR STRIP.AUTO-MCNC: 50 MG/DL
HCT VFR BLD AUTO: 42.4 % (ref 35–47)
HGB BLD-MCNC: 14.3 G/DL (ref 11.5–16)
HGB UR QL STRIP: NEGATIVE
IMM GRANULOCYTES # BLD AUTO: 0.04 K/UL (ref 0–0.04)
IMM GRANULOCYTES NFR BLD AUTO: 0.4 % (ref 0–0.5)
KETONES UR QL STRIP.AUTO: NEGATIVE MG/DL
LEUKOCYTE ESTERASE UR QL STRIP.AUTO: ABNORMAL
LYMPHOCYTES # BLD: 2.09 K/UL (ref 0.8–3.5)
LYMPHOCYTES NFR BLD: 20.2 % (ref 12–49)
MCH RBC QN AUTO: 28.1 PG (ref 26–34)
MCHC RBC AUTO-ENTMCNC: 33.7 G/DL (ref 30–36.5)
MCV RBC AUTO: 83.5 FL (ref 80–99)
MONOCYTES # BLD: 0.53 K/UL (ref 0–1)
MONOCYTES NFR BLD: 5.1 % (ref 5–13)
MUCOUS THREADS URNS QL MICRO: ABNORMAL /LPF
NEUTS SEG # BLD: 7.63 K/UL (ref 1.8–8)
NEUTS SEG NFR BLD: 73.5 % (ref 32–75)
NITRITE UR QL STRIP.AUTO: NEGATIVE
NRBC # BLD: 0 K/UL (ref 0–0.01)
NRBC BLD-RTO: 0 PER 100 WBC
PH UR STRIP: 5 (ref 5–8)
PLATELET # BLD AUTO: 315 K/UL (ref 150–400)
PMV BLD AUTO: 9 FL (ref 8.9–12.9)
POTASSIUM SERPL-SCNC: 3.6 MMOL/L (ref 3.5–5.1)
PROT UR STRIP-MCNC: NEGATIVE MG/DL
RBC # BLD AUTO: 5.08 M/UL (ref 3.8–5.2)
RBC #/AREA URNS HPF: ABNORMAL /HPF (ref 0–5)
SODIUM SERPL-SCNC: 137 MMOL/L (ref 136–145)
SP GR UR REFRACTOMETRY: 1.02 (ref 1–1.03)
URINE CULTURE IF INDICATED: ABNORMAL
UROBILINOGEN UR QL STRIP.AUTO: 0.1 EU/DL (ref 0.1–1)
WBC # BLD AUTO: 10.4 K/UL (ref 3.6–11)
WBC URNS QL MICRO: ABNORMAL /HPF (ref 0–4)

## 2025-04-30 PROCEDURE — 81001 URINALYSIS AUTO W/SCOPE: CPT

## 2025-04-30 PROCEDURE — 6370000000 HC RX 637 (ALT 250 FOR IP): Performed by: EMERGENCY MEDICINE

## 2025-04-30 PROCEDURE — 96374 THER/PROPH/DIAG INJ IV PUSH: CPT

## 2025-04-30 PROCEDURE — 80048 BASIC METABOLIC PNL TOTAL CA: CPT

## 2025-04-30 PROCEDURE — 74176 CT ABD & PELVIS W/O CONTRAST: CPT

## 2025-04-30 PROCEDURE — 36415 COLL VENOUS BLD VENIPUNCTURE: CPT

## 2025-04-30 PROCEDURE — 6360000002 HC RX W HCPCS: Performed by: EMERGENCY MEDICINE

## 2025-04-30 PROCEDURE — 85025 COMPLETE CBC W/AUTO DIFF WBC: CPT

## 2025-04-30 PROCEDURE — 99284 EMERGENCY DEPT VISIT MOD MDM: CPT

## 2025-04-30 RX ORDER — KETOROLAC TROMETHAMINE 15 MG/ML
15 INJECTION, SOLUTION INTRAMUSCULAR; INTRAVENOUS ONCE
Status: COMPLETED | OUTPATIENT
Start: 2025-04-30 | End: 2025-04-30

## 2025-04-30 RX ORDER — CYCLOBENZAPRINE HCL 10 MG
10 TABLET ORAL 3 TIMES DAILY PRN
Qty: 15 TABLET | Refills: 0 | Status: SHIPPED | OUTPATIENT
Start: 2025-04-30 | End: 2025-05-05

## 2025-04-30 RX ORDER — OXYCODONE AND ACETAMINOPHEN 5; 325 MG/1; MG/1
1 TABLET ORAL
Refills: 0 | Status: COMPLETED | OUTPATIENT
Start: 2025-04-30 | End: 2025-04-30

## 2025-04-30 RX ORDER — CYCLOBENZAPRINE HCL 10 MG
10 TABLET ORAL ONCE
Status: COMPLETED | OUTPATIENT
Start: 2025-04-30 | End: 2025-04-30

## 2025-04-30 RX ORDER — HYDROCODONE BITARTRATE AND ACETAMINOPHEN 5; 325 MG/1; MG/1
1 TABLET ORAL EVERY 6 HOURS PRN
Qty: 12 TABLET | Refills: 0 | Status: SHIPPED | OUTPATIENT
Start: 2025-04-30 | End: 2025-05-03

## 2025-04-30 RX ADMIN — KETOROLAC TROMETHAMINE 15 MG: 15 INJECTION, SOLUTION INTRAMUSCULAR; INTRAVENOUS at 10:34

## 2025-04-30 RX ADMIN — CYCLOBENZAPRINE 10 MG: 10 TABLET, FILM COATED ORAL at 10:35

## 2025-04-30 RX ADMIN — OXYCODONE HYDROCHLORIDE AND ACETAMINOPHEN 1 TABLET: 5; 325 TABLET ORAL at 10:35

## 2025-04-30 ASSESSMENT — PAIN DESCRIPTION - LOCATION: LOCATION: BACK;LEG

## 2025-04-30 ASSESSMENT — PAIN DESCRIPTION - ORIENTATION: ORIENTATION: RIGHT

## 2025-04-30 ASSESSMENT — LIFESTYLE VARIABLES
HOW MANY STANDARD DRINKS CONTAINING ALCOHOL DO YOU HAVE ON A TYPICAL DAY: PATIENT DOES NOT DRINK
HOW OFTEN DO YOU HAVE A DRINK CONTAINING ALCOHOL: NEVER

## 2025-04-30 ASSESSMENT — PAIN SCALES - GENERAL
PAINLEVEL_OUTOF10: 10
PAINLEVEL_OUTOF10: 10

## 2025-04-30 NOTE — ED PROVIDER NOTES
right-sided back and groin pain radiating down the leg; clinical suspicion was for musculoskeletal pain versus nephrolithiasis.  Differential Diagnosis:  Lumbar radiculopathy, musculoskeletal strain, herniated disc, renal colic, pyelonephritis, sacroiliitis, spinal stenosis, vertebral compression fracture  Labs:  CBC: Ordered to assess for leukocytosis or anemia; results were within normal limits.  BMP: Ordered to evaluate for KISHAN, electrolyte imbalance, and dehydration; results showed isolated hyperglycemia (glucose 234), otherwise unremarkable.  UA: Ordered to assess for UTI or hematuria; showed small leukocyte esterase, 1+ bacteria, 50 glucose, but not consistent with UTI; culture not indicated.  Imaging:  CT Abdomen/Pelvis without contrast: Ordered to evaluate for nephrolithiasis given groin pain; no stone identified, study otherwise unremarkable.    Considerations of tests not ordered:  I considered MRI spine lumbar but did not perform. I utilized my training and experience to weigh the risks and benefits of these considerations. At this time, I estimate the risks of performing these considerations to be equal to or greater than the risk of not performing them.     Disposition Considerations: Not applicable    ED Course  ED Course as of 04/30/25 1238   Wed Apr 30, 2025   0951 Triage:  Pt reports r lower back pain that radiates to groin area and down her r leg. Denies hx of kidney stones or sciatica. Pt denies nay heavy lifting or trauma.  [SW]   0956 History: A few days of right back pain radiating down the left leg and into the groin.  Pain goes to the thigh.  No fevers or saddle anesthesia.  No IV drug use history.  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  Exam: Obese.  No CVA tenderness.  Uncomfortable but nontoxic.  Normal strength in lower extremities with intact sensation light touch. [SW]   0958 Suspect likely musculoskeletal pain however she does have groin pain associated with it.  I

## 2025-04-30 NOTE — ED TRIAGE NOTES
Pt reports r lower back pain that radiates to groin area and down her r leg. Denies hx of kidney stones or sciatica. Pt denies nay heavy lifting or trauma